# Patient Record
Sex: FEMALE | Race: BLACK OR AFRICAN AMERICAN | NOT HISPANIC OR LATINO | URBAN - METROPOLITAN AREA
[De-identification: names, ages, dates, MRNs, and addresses within clinical notes are randomized per-mention and may not be internally consistent; named-entity substitution may affect disease eponyms.]

---

## 2024-05-03 RX ADMIN — Medication 20 MILLIGRAM(S): at 10:53

## 2024-05-18 ENCOUNTER — INPATIENT (INPATIENT)
Facility: HOSPITAL | Age: 89
LOS: 3 days | Discharge: NURSING, REHAB OR LONG-TERM CARE | End: 2024-05-22
Attending: INTERNAL MEDICINE | Admitting: INTERNAL MEDICINE
Payer: MEDICARE

## 2024-05-18 VITALS
HEART RATE: 116 BPM | SYSTOLIC BLOOD PRESSURE: 211 MMHG | DIASTOLIC BLOOD PRESSURE: 119 MMHG | OXYGEN SATURATION: 99 % | RESPIRATION RATE: 20 BRPM

## 2024-05-18 DIAGNOSIS — I21.4 NON-ST ELEVATION (NSTEMI) MYOCARDIAL INFARCTION: ICD-10-CM

## 2024-05-18 DIAGNOSIS — I48.91 UNSPECIFIED ATRIAL FIBRILLATION: ICD-10-CM

## 2024-05-18 LAB
ALBUMIN SERPL ELPH-MCNC: 3.8 G/DL — SIGNIFICANT CHANGE UP (ref 3.3–5)
ALBUMIN SERPL ELPH-MCNC: 4.2 G/DL — SIGNIFICANT CHANGE UP (ref 3.3–5)
ALP SERPL-CCNC: 61 U/L — SIGNIFICANT CHANGE UP (ref 40–120)
ALP SERPL-CCNC: 66 U/L — SIGNIFICANT CHANGE UP (ref 40–120)
ALT FLD-CCNC: 16 U/L — SIGNIFICANT CHANGE UP (ref 10–45)
ALT FLD-CCNC: 18 U/L — SIGNIFICANT CHANGE UP (ref 10–45)
ANION GAP SERPL CALC-SCNC: 14 MMOL/L — SIGNIFICANT CHANGE UP (ref 5–17)
ANION GAP SERPL CALC-SCNC: 14 MMOL/L — SIGNIFICANT CHANGE UP (ref 5–17)
APPEARANCE UR: CLEAR — SIGNIFICANT CHANGE UP
APTT BLD: 100.2 SEC — HIGH (ref 24.5–35.6)
APTT BLD: 31.1 SEC — SIGNIFICANT CHANGE UP (ref 24.5–35.6)
AST SERPL-CCNC: 48 U/L — HIGH (ref 10–40)
AST SERPL-CCNC: 50 U/L — HIGH (ref 10–40)
BACTERIA # UR AUTO: NEGATIVE /HPF — SIGNIFICANT CHANGE UP
BASE EXCESS BLDV CALC-SCNC: 3.7 MMOL/L — HIGH (ref -2–3)
BASOPHILS # BLD AUTO: 0.06 K/UL — SIGNIFICANT CHANGE UP (ref 0–0.2)
BASOPHILS NFR BLD AUTO: 0.7 % — SIGNIFICANT CHANGE UP (ref 0–2)
BILIRUB SERPL-MCNC: 0.8 MG/DL — SIGNIFICANT CHANGE UP (ref 0.2–1.2)
BILIRUB SERPL-MCNC: 1 MG/DL — SIGNIFICANT CHANGE UP (ref 0.2–1.2)
BILIRUB UR-MCNC: NEGATIVE — SIGNIFICANT CHANGE UP
BUN SERPL-MCNC: 22 MG/DL — SIGNIFICANT CHANGE UP (ref 7–23)
BUN SERPL-MCNC: 23 MG/DL — SIGNIFICANT CHANGE UP (ref 7–23)
CA-I SERPL-SCNC: 1.11 MMOL/L — LOW (ref 1.15–1.33)
CALCIUM SERPL-MCNC: 10.2 MG/DL — SIGNIFICANT CHANGE UP (ref 8.4–10.5)
CALCIUM SERPL-MCNC: 9.2 MG/DL — SIGNIFICANT CHANGE UP (ref 8.4–10.5)
CAST: 4 /LPF — SIGNIFICANT CHANGE UP (ref 0–4)
CHLORIDE SERPL-SCNC: 100 MMOL/L — SIGNIFICANT CHANGE UP (ref 96–108)
CHLORIDE SERPL-SCNC: 104 MMOL/L — SIGNIFICANT CHANGE UP (ref 96–108)
CK MB CFR SERPL CALC: 23.5 NG/ML — HIGH (ref 0–6.7)
CK MB CFR SERPL CALC: 24.5 NG/ML — HIGH (ref 0–6.7)
CK SERPL-CCNC: 1950 U/L — HIGH (ref 25–170)
CK SERPL-CCNC: SIGNIFICANT CHANGE UP (ref 25–170)
CO2 BLDV-SCNC: 31.2 MMOL/L — HIGH (ref 22–26)
CO2 SERPL-SCNC: 24 MMOL/L — SIGNIFICANT CHANGE UP (ref 22–31)
CO2 SERPL-SCNC: 25 MMOL/L — SIGNIFICANT CHANGE UP (ref 22–31)
COLOR SPEC: YELLOW — SIGNIFICANT CHANGE UP
CREAT SERPL-MCNC: 1.11 MG/DL — SIGNIFICANT CHANGE UP (ref 0.5–1.3)
CREAT SERPL-MCNC: 1.17 MG/DL — SIGNIFICANT CHANGE UP (ref 0.5–1.3)
DIFF PNL FLD: ABNORMAL
EGFR: 42 ML/MIN/1.73M2 — LOW
EGFR: 45 ML/MIN/1.73M2 — LOW
EOSINOPHIL # BLD AUTO: 0.06 K/UL — SIGNIFICANT CHANGE UP (ref 0–0.5)
EOSINOPHIL NFR BLD AUTO: 0.7 % — SIGNIFICANT CHANGE UP (ref 0–6)
GAS PNL BLDV: 136 MMOL/L — SIGNIFICANT CHANGE UP (ref 136–145)
GAS PNL BLDV: SIGNIFICANT CHANGE UP
GAS PNL BLDV: SIGNIFICANT CHANGE UP
GLUCOSE SERPL-MCNC: 107 MG/DL — HIGH (ref 70–99)
GLUCOSE SERPL-MCNC: 108 MG/DL — HIGH (ref 70–99)
GLUCOSE UR QL: NEGATIVE MG/DL — SIGNIFICANT CHANGE UP
HCO3 BLDV-SCNC: 30 MMOL/L — HIGH (ref 22–29)
HCT VFR BLD CALC: 40.8 % — SIGNIFICANT CHANGE UP (ref 34.5–45)
HGB BLD-MCNC: 13.5 G/DL — SIGNIFICANT CHANGE UP (ref 11.5–15.5)
HYALINE CASTS # UR AUTO: PRESENT
IMM GRANULOCYTES NFR BLD AUTO: 0.1 % — SIGNIFICANT CHANGE UP (ref 0–0.9)
INR BLD: 0.99 — SIGNIFICANT CHANGE UP (ref 0.85–1.18)
KETONES UR-MCNC: 15 MG/DL
LACTATE SERPL-SCNC: 2 MMOL/L — SIGNIFICANT CHANGE UP (ref 0.5–2)
LACTATE SERPL-SCNC: 2.2 MMOL/L — HIGH (ref 0.5–2)
LEUKOCYTE ESTERASE UR-ACNC: ABNORMAL
LIDOCAIN IGE QN: 29 U/L — SIGNIFICANT CHANGE UP (ref 7–60)
LYMPHOCYTES # BLD AUTO: 3.6 K/UL — HIGH (ref 1–3.3)
LYMPHOCYTES # BLD AUTO: 41.3 % — SIGNIFICANT CHANGE UP (ref 13–44)
MAGNESIUM SERPL-MCNC: 2.2 MG/DL — SIGNIFICANT CHANGE UP (ref 1.6–2.6)
MCHC RBC-ENTMCNC: 30.6 PG — SIGNIFICANT CHANGE UP (ref 27–34)
MCHC RBC-ENTMCNC: 33.1 GM/DL — SIGNIFICANT CHANGE UP (ref 32–36)
MCV RBC AUTO: 92.5 FL — SIGNIFICANT CHANGE UP (ref 80–100)
MONOCYTES # BLD AUTO: 0.51 K/UL — SIGNIFICANT CHANGE UP (ref 0–0.9)
MONOCYTES NFR BLD AUTO: 5.8 % — SIGNIFICANT CHANGE UP (ref 2–14)
NEUTROPHILS # BLD AUTO: 4.48 K/UL — SIGNIFICANT CHANGE UP (ref 1.8–7.4)
NEUTROPHILS NFR BLD AUTO: 51.4 % — SIGNIFICANT CHANGE UP (ref 43–77)
NITRITE UR-MCNC: NEGATIVE — SIGNIFICANT CHANGE UP
NRBC # BLD: 0 /100 WBCS — SIGNIFICANT CHANGE UP (ref 0–0)
NT-PROBNP SERPL-SCNC: HIGH PG/ML (ref 0–300)
PCO2 BLDV: 49 MMHG — HIGH (ref 39–42)
PH BLDV: 7.39 — SIGNIFICANT CHANGE UP (ref 7.32–7.43)
PH UR: 6.5 — SIGNIFICANT CHANGE UP (ref 5–8)
PHOSPHATE SERPL-MCNC: 4.7 MG/DL — HIGH (ref 2.5–4.5)
PLATELET # BLD AUTO: 134 K/UL — LOW (ref 150–400)
PO2 BLDV: <33 MMHG — LOW (ref 25–45)
POTASSIUM BLDV-SCNC: 6.7 MMOL/L — CRITICAL HIGH (ref 3.5–5.1)
POTASSIUM SERPL-MCNC: 3.5 MMOL/L — SIGNIFICANT CHANGE UP (ref 3.5–5.3)
POTASSIUM SERPL-MCNC: 3.7 MMOL/L — SIGNIFICANT CHANGE UP (ref 3.5–5.3)
POTASSIUM SERPL-SCNC: 3.5 MMOL/L — SIGNIFICANT CHANGE UP (ref 3.5–5.3)
POTASSIUM SERPL-SCNC: 3.7 MMOL/L — SIGNIFICANT CHANGE UP (ref 3.5–5.3)
PROT SERPL-MCNC: 7.1 G/DL — SIGNIFICANT CHANGE UP (ref 6–8.3)
PROT SERPL-MCNC: 8.5 G/DL — HIGH (ref 6–8.3)
PROT UR-MCNC: 300 MG/DL
PROTHROM AB SERPL-ACNC: 11.3 SEC — SIGNIFICANT CHANGE UP (ref 9.5–13)
RAPID RVP RESULT: SIGNIFICANT CHANGE UP
RBC # BLD: 4.41 M/UL — SIGNIFICANT CHANGE UP (ref 3.8–5.2)
RBC # FLD: 13.6 % — SIGNIFICANT CHANGE UP (ref 10.3–14.5)
RBC CASTS # UR COMP ASSIST: 3 /HPF — SIGNIFICANT CHANGE UP (ref 0–4)
SAO2 % BLDV: 32.2 % — LOW (ref 67–88)
SARS-COV-2 RNA SPEC QL NAA+PROBE: SIGNIFICANT CHANGE UP
SODIUM SERPL-SCNC: 138 MMOL/L — SIGNIFICANT CHANGE UP (ref 135–145)
SODIUM SERPL-SCNC: 143 MMOL/L — SIGNIFICANT CHANGE UP (ref 135–145)
SP GR SPEC: 1.01 — SIGNIFICANT CHANGE UP (ref 1–1.03)
SQUAMOUS # UR AUTO: 5 /HPF — SIGNIFICANT CHANGE UP (ref 0–5)
TROPONIN T, HIGH SENSITIVITY RESULT: 529 NG/L — CRITICAL HIGH (ref 0–51)
TROPONIN T, HIGH SENSITIVITY RESULT: 665 NG/L — CRITICAL HIGH (ref 0–51)
TROPONIN T, HIGH SENSITIVITY RESULT: 725 NG/L — CRITICAL HIGH (ref 0–51)
UROBILINOGEN FLD QL: 1 MG/DL — SIGNIFICANT CHANGE UP (ref 0.2–1)
WBC # BLD: 8.72 K/UL — SIGNIFICANT CHANGE UP (ref 3.8–10.5)
WBC # FLD AUTO: 8.72 K/UL — SIGNIFICANT CHANGE UP (ref 3.8–10.5)
WBC UR QL: 9 /HPF — HIGH (ref 0–5)

## 2024-05-18 PROCEDURE — 99291 CRITICAL CARE FIRST HOUR: CPT

## 2024-05-18 PROCEDURE — 93010 ELECTROCARDIOGRAM REPORT: CPT

## 2024-05-18 PROCEDURE — 71045 X-RAY EXAM CHEST 1 VIEW: CPT | Mod: 26

## 2024-05-18 PROCEDURE — 93010 ELECTROCARDIOGRAM REPORT: CPT | Mod: 1L,77

## 2024-05-18 PROCEDURE — 99223 1ST HOSP IP/OBS HIGH 75: CPT

## 2024-05-18 RX ORDER — HEPARIN SODIUM 5000 [USP'U]/ML
3500 INJECTION INTRAVENOUS; SUBCUTANEOUS ONCE
Refills: 0 | Status: COMPLETED | OUTPATIENT
Start: 2024-05-18 | End: 2024-05-18

## 2024-05-18 RX ORDER — METOPROLOL TARTRATE 50 MG
12.5 TABLET ORAL EVERY 12 HOURS
Refills: 0 | Status: DISCONTINUED | OUTPATIENT
Start: 2024-05-18 | End: 2024-05-19

## 2024-05-18 RX ORDER — CLOPIDOGREL BISULFATE 75 MG/1
300 TABLET, FILM COATED ORAL ONCE
Refills: 0 | Status: COMPLETED | OUTPATIENT
Start: 2024-05-18 | End: 2024-05-18

## 2024-05-18 RX ORDER — LABETALOL HCL 100 MG
10 TABLET ORAL ONCE
Refills: 0 | Status: COMPLETED | OUTPATIENT
Start: 2024-05-18 | End: 2024-05-18

## 2024-05-18 RX ORDER — SODIUM CHLORIDE 9 MG/ML
500 INJECTION INTRAMUSCULAR; INTRAVENOUS; SUBCUTANEOUS ONCE
Refills: 0 | Status: DISCONTINUED | OUTPATIENT
Start: 2024-05-18 | End: 2024-05-18

## 2024-05-18 RX ORDER — HEPARIN SODIUM 5000 [USP'U]/ML
INJECTION INTRAVENOUS; SUBCUTANEOUS
Qty: 25000 | Refills: 0 | Status: DISCONTINUED | OUTPATIENT
Start: 2024-05-18 | End: 2024-05-19

## 2024-05-18 RX ORDER — SODIUM CHLORIDE 9 MG/ML
500 INJECTION INTRAMUSCULAR; INTRAVENOUS; SUBCUTANEOUS ONCE
Refills: 0 | Status: COMPLETED | OUTPATIENT
Start: 2024-05-18 | End: 2024-05-18

## 2024-05-18 RX ORDER — GABAPENTIN 400 MG/1
100 CAPSULE ORAL ONCE
Refills: 0 | Status: COMPLETED | OUTPATIENT
Start: 2024-05-18 | End: 2024-05-18

## 2024-05-18 RX ORDER — HEPARIN SODIUM 5000 [USP'U]/ML
3500 INJECTION INTRAVENOUS; SUBCUTANEOUS EVERY 6 HOURS
Refills: 0 | Status: DISCONTINUED | OUTPATIENT
Start: 2024-05-18 | End: 2024-05-19

## 2024-05-18 RX ORDER — ISOSORBIDE DINITRATE 5 MG/1
10 TABLET ORAL THREE TIMES A DAY
Refills: 0 | Status: DISCONTINUED | OUTPATIENT
Start: 2024-05-18 | End: 2024-05-18

## 2024-05-18 RX ORDER — ATORVASTATIN CALCIUM 80 MG/1
80 TABLET, FILM COATED ORAL AT BEDTIME
Refills: 0 | Status: DISCONTINUED | OUTPATIENT
Start: 2024-05-18 | End: 2024-05-22

## 2024-05-18 RX ORDER — LANOLIN ALCOHOL/MO/W.PET/CERES
3 CREAM (GRAM) TOPICAL AT BEDTIME
Refills: 0 | Status: DISCONTINUED | OUTPATIENT
Start: 2024-05-18 | End: 2024-05-22

## 2024-05-18 RX ORDER — HYDRALAZINE HCL 50 MG
25 TABLET ORAL DAILY
Refills: 0 | Status: DISCONTINUED | OUTPATIENT
Start: 2024-05-19 | End: 2024-05-20

## 2024-05-18 RX ORDER — HYDRALAZINE HCL 50 MG
10 TABLET ORAL ONCE
Refills: 0 | Status: COMPLETED | OUTPATIENT
Start: 2024-05-18 | End: 2024-05-18

## 2024-05-18 RX ORDER — HYDRALAZINE HCL 50 MG
25 TABLET ORAL ONCE
Refills: 0 | Status: COMPLETED | OUTPATIENT
Start: 2024-05-18 | End: 2024-05-18

## 2024-05-18 RX ORDER — ISOSORBIDE DINITRATE 5 MG/1
10 TABLET ORAL ONCE
Refills: 0 | Status: COMPLETED | OUTPATIENT
Start: 2024-05-18 | End: 2024-05-18

## 2024-05-18 RX ORDER — ASPIRIN/CALCIUM CARB/MAGNESIUM 324 MG
324 TABLET ORAL ONCE
Refills: 0 | Status: COMPLETED | OUTPATIENT
Start: 2024-05-18 | End: 2024-05-18

## 2024-05-18 RX ORDER — LABETALOL HCL 100 MG
20 TABLET ORAL ONCE
Refills: 0 | Status: COMPLETED | OUTPATIENT
Start: 2024-05-18 | End: 2024-05-03

## 2024-05-18 RX ORDER — CLOPIDOGREL BISULFATE 75 MG/1
600 TABLET, FILM COATED ORAL ONCE
Refills: 0 | Status: DISCONTINUED | OUTPATIENT
Start: 2024-05-18 | End: 2024-05-18

## 2024-05-18 RX ADMIN — HEPARIN SODIUM 700 UNIT(S)/HR: 5000 INJECTION INTRAVENOUS; SUBCUTANEOUS at 15:19

## 2024-05-18 RX ADMIN — Medication 10 MILLIGRAM(S): at 12:45

## 2024-05-18 RX ADMIN — Medication 324 MILLIGRAM(S): at 11:30

## 2024-05-18 RX ADMIN — HEPARIN SODIUM 3500 UNIT(S): 5000 INJECTION INTRAVENOUS; SUBCUTANEOUS at 15:19

## 2024-05-18 RX ADMIN — ATORVASTATIN CALCIUM 80 MILLIGRAM(S): 80 TABLET, FILM COATED ORAL at 21:42

## 2024-05-18 RX ADMIN — SODIUM CHLORIDE 500 MILLILITER(S): 9 INJECTION INTRAMUSCULAR; INTRAVENOUS; SUBCUTANEOUS at 10:40

## 2024-05-18 RX ADMIN — Medication 12.5 MILLIGRAM(S): at 22:36

## 2024-05-18 RX ADMIN — ISOSORBIDE DINITRATE 10 MILLIGRAM(S): 5 TABLET ORAL at 13:29

## 2024-05-18 RX ADMIN — Medication 25 MILLIGRAM(S): at 13:57

## 2024-05-18 RX ADMIN — GABAPENTIN 100 MILLIGRAM(S): 400 CAPSULE ORAL at 23:09

## 2024-05-18 RX ADMIN — Medication 10 MILLIGRAM(S): at 10:38

## 2024-05-18 RX ADMIN — ISOSORBIDE DINITRATE 10 MILLIGRAM(S): 5 TABLET ORAL at 20:06

## 2024-05-18 RX ADMIN — Medication 3 MILLIGRAM(S): at 23:09

## 2024-05-18 RX ADMIN — HEPARIN SODIUM 700 UNIT(S)/HR: 5000 INJECTION INTRAVENOUS; SUBCUTANEOUS at 20:23

## 2024-05-18 NOTE — H&P ADULT - PROBLEM SELECTOR PLAN 1
Labs remarkable for Trop 665->725, CK 1950, CKMB 24.5, EKG ISHMAEL V3-V5, patient chest pain free, HDS, on room air  - Heparin gtt for 48 hours  - continous pulse ox, tele monitoring, vitals q4    F: None  E: Replete if K<4 or Mag<2  N: DASH Diet  GIppx: pantoprazole 40 qd  VTEppx: heparin gtt  Dispo: after heparin gtt for 48 hours  PT: ordered Labs remarkable for Trop 665->725, CK 1950, CKMB 24.5 -> will trend Trop to peak  - EKG ISHMAEL V3-V5, patient chest pain free, HDS, on room air  - Heparin gtt for 48 hours  - continous pulse ox, tele monitoring, vitals q4  - f/u TSH, T4, lipid panel, A1c in AM    #Hypertensive Emergency  SBP 200s on admit  - will continue Hydral 25 PO QD and Isordil 10 TID  - goal -180s 5/18/24 and <140s 5/19/24    F: None  E: Replete if K<4 or Mag<2  N: DASH Diet  GIppx: pantoprazole 40 qd  VTEppx: heparin gtt  Dispo: after heparin gtt for 48 hours  PT: ordered Labs remarkable for Trop 665->725, CK 1950, CKMB 24.5 -> will trend Trop to peak  - EKG ISHMAEL V3-V5, patient chest pain free, HDS, on room air  - TTE revealing decreased EF as well as global hypokinesia  - Heparin gtt for 48 hours  - continous pulse ox, tele monitoring, vitals q4  - f/u TSH, T4, lipid panel, A1c in AM    #Hypertensive Emergency  SBP 200s on admit  - will continue Hydral 25 PO QD and Isordil 10 TID  - goal -180s 5/18/24 and <140s 5/19/24    F: None  E: Replete if K<4 or Mag<2  N: DASH Diet  GIppx: pantoprazole 40 qd  VTEppx: heparin gtt  Dispo: after heparin gtt for 48 hours  PT: ordered Labs remarkable for Trop 665->725, CK 1950, CKMB 24.5 -> will trend Trop to peak  - EKG ISHMAEL V3-V5, patient chest pain free, HDS, on room air  - TTE revealing decreased EF as well as global hypokinesia  - Heparin gtt for 48 hours  - continuous pulse ox, tele monitoring, vitals q4  - f/u TSH, T4, lipid panel, A1c in AM  - will start Lopressor 12.5 mg PO BID and Lipitor 80 QHS    #Hypertensive Emergency  SBP 200s on admit  - goal -180s 5/18/24 and <140s 5/19/24    F: None  E: Replete if K<4 or Mag<2  N: DASH Diet  GIppx: pantoprazole 40 qd  VTEppx: heparin gtt  Dispo: after heparin gtt for 48 hours  PT: ordered Labs remarkable for Trop 665->725, CK 1950, CKMB 24.5 -> will trend Trop to peak  - EKG ISHMAEL V3-V5, patient chest pain free, HDS, on room air  - TTE revealing decreased EF as well as global hypokinesia  - will need repeat official TTE  - Heparin gtt for 48 hours  - continuous pulse ox, tele monitoring, vitals q4  - f/u TSH, T4, lipid panel, A1c in AM  - will start Lopressor 12.5 mg PO BID and Lipitor 80 QHS    #Hypertensive Emergency  SBP 200s on admit  - goal -180s 5/18/24 and <140s 5/19/24    F: None  E: Replete if K<4 or Mag<2  N: DASH Diet  GIppx: pantoprazole 40 qd  VTEppx: heparin gtt  Dispo: after heparin gtt for 48 hours  PT: ordered

## 2024-05-18 NOTE — ED PROVIDER NOTE - OBJECTIVE STATEMENT
89 -year-old brought in by EMS, patient has advanced dementia and lives with her son, Scottish speaking, patient has not been feeling well this week with decreased p.o. intake and vomiting x 1 on Wednesday, patient with worsening weakness, no chest pain complaints, and route EMS performed EKG with elevations of ST segment in V3 V4 V5 and STEMI alert called, no fevers, patient contributes minimally to history although actively denying pain at this time.    Patient is on Eliquis and metoprolol at baseline although son reports a history of heart problems he cannot specify

## 2024-05-18 NOTE — ED ADULT TRIAGE NOTE - NS ED TRIAGE CLINICAL UPGRADE PROVIDER FT
Manjeet Low Dose Naltrexone Pregnancy And Lactation Text: Naltrexone is pregnancy category C.  There have been no adequate and well-controlled studies in pregnant women.  It should be used in pregnancy only if the potential benefit justifies the potential risk to the fetus.   Limited data indicates that naltrexone is minimally excreted into breastmilk.

## 2024-05-18 NOTE — ED ADULT NURSE NOTE - OBJECTIVE STATEMENT
Pt. hx of Afib, dementia, HTN, on eliquis and metoprolol, a&ox2 @ baseline, comes to ED bibems as a "STEMI Alert" with r/o ST elevation in V2-V4. Pt. as per son who cares for her, stated she had not been "acting herself" the last few days, had nbnb vomiting on Wednesday and a little this morning, and has been more tired and weaker than usual. Pt. never verbalized the presence of chest pain, SOB, dizziness, and as per son, pt. can usually make pain / needs known. Pt. made UG to MD Burroughs, ccm, pulseox, BP, ekg, and PIVs initiated. Pt. extremely hypertensive 240s/120s on arrival. NAD, sitting comfortably ATT.

## 2024-05-18 NOTE — PATIENT PROFILE ADULT - FALL HARM RISK - HARM RISK INTERVENTIONS
Assistance with ambulation/Assistance OOB with selected safe patient handling equipment/Communicate Risk of Fall with Harm to all staff/Reinforce activity limits and safety measures with patient and family/Tailored Fall Risk Interventions/Use of alarms - bed, chair and/or voice tab/Visual Cue: Yellow wristband and red socks/Bed in lowest position, wheels locked, appropriate side rails in place/Call bell, personal items and telephone in reach/Instruct patient to call for assistance before getting out of bed or chair/Non-slip footwear when patient is out of bed/[place] to call system/Physically safe environment - no spills, clutter or unnecessary equipment/Purposeful Proactive Rounding/Room/bathroom lighting operational, light cord in reach

## 2024-05-18 NOTE — H&P ADULT - PROBLEM SELECTOR PLAN 2
on metoprolol and eliquis at home per son   - AC: heparin gtt  - Rate control: Lopressor 12.5 mg PO BID

## 2024-05-18 NOTE — ED PROVIDER NOTE - CLINICAL SUMMARY MEDICAL DECISION MAKING FREE TEXT BOX
patient with increased lethargy / decreased PO intake and vomiting last week. presenting with HTN and ST elevations on EKG, stemi alert called, Cardiology at bedside and performed bedside echo, pt w decreased EF / global hypokinesia . as pt CP free did not recommend cath lab at this time.   plan to control BP, eval also for metabolic abnormalities, gently hydrate,   once eval trop and response to BP management , will consider further anticoagulation for suspected MI . patient with increased lethargy / decreased PO intake and vomiting last week. presenting with HTN and ST elevations on EKG, stemi alert called, Cardiology at bedside and performed bedside echo, pt w decreased EF / global hypokinesia . as pt CP free did not recommend cath lab at this time.   plan to control BP, eval also for metabolic abnormalities, gently hydrate,   once eval trop and response to BP management , will consider further anticoagulation for suspected MI .    pt seen by Interventional cards and CCU fellow , advised change to hydralazine and isosorbide for HTN management and admission to cards tele

## 2024-05-18 NOTE — H&P ADULT - ASSESSMENT
89yo Vietnamese-Creole speaking F PMHx dementia A&O x 2 (place and name, at baseline) BIBEMS to [facility] ED 5/18/24 for increased lethargy, decreased PO intake and vomitting for the last week and was found to be HTN SBP 200s as well as ISHMAEL on EKG with subsequent STEMI code called in [facility] ED. Patient not a cath candidate as she is asymptomatic, poor functional status and dementia. Patient admitted to [facility] cardiac telemetry for NSTEMI medical management and hypertensive urgency. 89yo Malay-Creole speaking F PMHx dementia A&O x 2 (place and name, at baseline) BIBEMS to [facility] ED 5/18/24 for increased lethargy, decreased PO intake and vomitting for the last week and was found to be HTN SBP 200s as well as ISHMAEL on EKG with subsequent STEMI code called in [facility] ED. Patient not a cath candidate as she is asymptomatic, poor functional status and dementia. Patient admitted to [facility] cardiac telemetry for NSTEMI medical management and hypertensive urgency. Will order CT head before Plavix load. Will start Lopressor 12.5 BID.

## 2024-05-18 NOTE — ED ADULT NURSE NOTE - NSFALLHARMRISKINTERV_ED_ALL_ED
Assistance OOB with selected safe patient handling equipment if applicable/Assistance with ambulation/Communicate risk of Fall with Harm to all staff, patient, and family/Monitor gait and stability/Monitor for mental status changes and reorient to person, place, and time, as needed/Provide visual cue: red socks, yellow wristband, yellow gown, etc/Reinforce activity limits and safety measures with patient and family/Toileting schedule using arm’s reach rule for commode and bathroom/Use of alarms - bed, stretcher, chair and/or video monitoring/Bed in lowest position, wheels locked, appropriate side rails in place/Call bell, personal items and telephone in reach/Instruct patient to call for assistance before getting out of bed/chair/stretcher/Non-slip footwear applied when patient is off stretcher/[place] to call system/Physically safe environment - no spills, clutter or unnecessary equipment/Purposeful Proactive Rounding/Room/bathroom lighting operational, light cord in reach

## 2024-05-18 NOTE — H&P ADULT - HISTORY OF PRESENT ILLNESS
89yoF PMHx _____ BIBEMS to [facility] ED 5/18/24 for increased lethargy, decreased PO intake and vomitting for the last week and was found to be HTN SBP 200s as well as ISHMAEL on EKG with subsequent STEMI alert called. Cardiology Fellow at bedside who performed decreased EF as well as global hypokensia. Patient chest pain free. Patient admitted to [facility] cardiac telemtry for NSTEMI management and hypertensive urgency.      /119  min SpO2 99% room air  Urine: 9 WBC, moderate blood, ketone 15, trace LE, negative nitrite  CXR 5/18/24: no acute infiltrates  Labs remarkable for Trop 665->725, CK 1950, CKMB 24.5, ProBNP [id number]  Interventions in ED: Aspirin 325 mg PO X 1, Heparin gtt, Hydral 10 mg IV X 1, Hydral 25 mg PO X 1, Isordil 10 mg PO X 1, Labetalol 10 IV x 1, Labetolol 20 mg IV x 1, NS 500mL bolus x 2  89yoF PMHx dementia A&O x 2 (place and name, at baseline) BIBEMS to [facility] ED 5/18/24 for increased lethargy, decreased PO intake and vomitting for the last week and was found to be HTN SBP 200s as well as ISHMAEL on EKG with subsequent STEMI alert called in [facility] ED. Cardiology Fellow at bedside who performed bedside TTE revealing decreased EF as well as global hypokinesia. Patient chest pain free. Patient not a cath candidate as she is asymptomatic, poor functional status and dementia. Patient admitted to [facility] cardiac telemetry for NSTEMI medical management and hypertensive urgency.    In speaking with the patients son over the phone, he stated that 3 days before admission, the patient began violently vomitting in her sleep at 2 am and did not wake up until 9 am. He states that since then, she has been vomitting intermittently and he notices her appetite is significantly reduced. Son says she was last seen by her doctors, Dr. Mikal Lacey, 6 years ago. She is on Metoprolol and Eliquis for a heart problem although he does not recall. Patient son stated she has never had a heart procedure or any other surgery, has never been admitted to the hospital before. She lives with her son and uses a walker that her neighbor gave her. Per son, patient has not had a fall or head strike, never had a stroke, no diabetes history, never had a blood clot or embolism.       Vitals in ED: /119  min SpO2 99% room air  Urinalysis: 9 WBC, moderate blood, ketone 15, trace LE, negative nitrite  CXR 5/18/24: no acute infiltrates  Labs remarkable for Trop 665->725, CK 1950, CKMB 24.5, ProBNP [id number]  Interventions in ED: Aspirin 325 mg PO X 1, Heparin gtt, Hydral 10 mg IV X 1, Hydral 25 mg PO X 1, Isordil 10 mg PO X 1, Labetalol 10 IV x 1, Labetolol 20 mg IV x 1, NS 500mL bolus x 2  89yo Hungarian-Creole speaking F PMHx dementia A&O x 2 (place and name, at baseline) BIBEMS to [facility] ED 5/18/24 for increased lethargy, decreased PO intake and vomitting for the last week and was found to be HTN SBP 200s as well as ISHMAEL on EKG with subsequent STEMI alert called in [facility] ED. Cardiology Fellow at bedside who performed bedside TTE revealing decreased EF as well as global hypokinesia. Patient chest pain free. Patient not a cath candidate as she is asymptomatic, poor functional status and dementia. Patient admitted to [facility] cardiac telemetry for NSTEMI medical management and hypertensive urgency.    In speaking with the patients son over the phone, he stated that 3 days before admission, the patient began violently vomitting in her sleep at 2 am and did not wake up until 9 am. He states that since then, she has been vomitting intermittently and he notices her appetite is significantly reduced. Son says she was last seen by her doctors, Dr. Mikal Lacey, 6 years ago. She is on Metoprolol and Eliquis for a heart problem although he does not recall. Patient son stated she has never had a heart procedure or any other surgery, has never been admitted to the hospital before. She lives with her son and uses a walker that her neighbor gave her. Per son, patient has not had a fall or head strike, never had a stroke, no diabetes history, never had a blood clot or embolism.       Vitals in ED: /119  min SpO2 99% room air  Urinalysis: 9 WBC, moderate blood, ketone 15, trace LE, negative nitrite  CXR 5/18/24: no acute infiltrates  Labs remarkable for Trop 665->725, CK 1950, CKMB 24.5, ProBNP 20039  Interventions in ED: Aspirin 325 mg PO X 1, Heparin gtt, Hydral 10 mg IV X 1, Hydral 25 mg PO X 1, Isordil 10 mg PO X 1, Labetalol 10 IV x 1, Labetolol 20 mg IV x 1, NS 500mL bolus x 2  89yo Vietnamese-Creole speaking F PMHx dementia A&O x 2 (place and name, at baseline) BIBEMS to [facility] ED 5/18/24 for increased lethargy, decreased PO intake and vomitting for the last week and was found to be HTN SBP 200s as well as ISHMAEL on EKG with subsequent STEMI code called in [facility] ED. Cardiology Fellow at bedside who performed bedside TTE revealing decreased EF as well as global hypokinesia. Patient chest pain free. Patient not a cath candidate as she is asymptomatic, poor functional status and dementia. Patient admitted to [facility] cardiac telemetry for NSTEMI medical management and hypertensive urgency.    In speaking with the patients son over the phone, he stated that 3 days before admission, the patient began violently vomitting in her sleep at 2 am and did not wake up until 9 am. He states that since then, she has been vomitting intermittently and he notices her appetite is significantly reduced. Son says she was last seen by her doctors, Dr. Mikal Lacey, 6 years ago. She is on Metoprolol and Eliquis for a heart problem although he does not recall. Patient son stated she has never had a heart procedure or any other surgery, has never been admitted to the hospital before. She lives with her son and uses a walker that her neighbor gave her. Per son, patient has not had a fall or head strike, never had a stroke, no diabetes history, never had a blood clot or embolism.       Vitals in ED: /119  min SpO2 99% room air  Urinalysis: 9 WBC, moderate blood, ketone 15, trace LE, negative nitrite  CXR 5/18/24: no acute infiltrates  Labs remarkable for Trop 665->725, CK 1950, CKMB 24.5, ProBNP 44066  Interventions in ED: Aspirin 325 mg PO X 1, Heparin gtt, Hydral 10 mg IV X 1, Hydral 25 mg PO X 1, Isordil 10 mg PO X 1, Labetalol 10 IV x 1, Labetolol 20 mg IV x 1, NS 500mL bolus x 2  89yo Bulgarian-Creole speaking F PMHx dementia A&O x 2 (place and name, at baseline),  Afib (on eliquis and metoprolol at home unclear dose) BIBEMS to [facility] ED 5/18/24 for increased lethargy, decreased PO intake and vomitting for the last week and was found to be HTN SBP 200s as well as ISHMAEL on EKG with subsequent STEMI code called in [facility] ED. Cardiology Fellow at bedside who performed bedside TTE revealing decreased EF as well as global hypokinesia. Patient chest pain free. Patient not a cath candidate as she is asymptomatic, poor functional status and dementia. Patient admitted to [facility] cardiac telemetry for NSTEMI medical management and hypertensive urgency.    In speaking with the patients son over the phone, he stated that 3 days before admission, the patient began violently vomitting in her sleep at 2 am and did not wake up until 9 am. He states that since then, she has been vomitting intermittently and he notices her appetite is significantly reduced. Son says she was last seen by her doctors, Dr. Mikal Lacey, 6 years ago. She is on Metoprolol and Eliquis for a heart problem although he does not recall. Patient son stated she has never had a heart procedure or any other surgery, has never been admitted to the hospital before. She lives with her son and uses a walker that her neighbor gave her. Per son, patient has not had a fall or head strike, never had a stroke, no diabetes history, never had a blood clot or embolism.       Vitals in ED: /119  min SpO2 99% room air  Urinalysis: 9 WBC, moderate blood, ketone 15, trace LE, negative nitrite  CXR 5/18/24: no acute infiltrates  Labs remarkable for Trop 665->725, CK 1950, CKMB 24.5, ProBNP 94506  Interventions in ED: Aspirin 325 mg PO X 1, Heparin gtt, Hydral 10 mg IV X 1, Hydral 25 mg PO X 1, Isordil 10 mg PO X 1, Labetalol 10 IV x 1, Labetolol 20 mg IV x 1, NS 500mL bolus x 2

## 2024-05-18 NOTE — PATIENT PROFILE ADULT - NSTOBACCONEVERSMOKERY/N_GEN_A
Routing refill request to provider for review/approval because:  Labs out of range:  BP    BP Readings from Last 3 Encounters:   11/05/19 139/75   10/10/19 (!) 152/68   09/30/19 (!) 156/89     Last OV 10/10/19.     Last written for 2 month supply on 10/10/19.     Plan to follow up for BP recheck in 1 month (patient was seen in Cardiology on 11/5/19).     No future appointment with Matt Swan PA-C.     Lesley Aaron RN BSN           Yes

## 2024-05-18 NOTE — ED ADULT TRIAGE NOTE - CHIEF COMPLAINT QUOTE
Pt BIBEMS from home for vomiting x 1 day, EMS note for STEMI Alert. History of dementia, not verbalizing CP during assessment. Accompanied by family. MD Burroughs and cardiac fellow at bedside.

## 2024-05-18 NOTE — H&P ADULT - NSHPLABSRESULTS_GEN_ALL_CORE
13.5   8.72  )-----------( 134      ( 18 May 2024 10:16 )             40.8       05-18    143  |  104  |  22  ----------------------------<  107<H>  3.5   |  25  |  1.17    Ca    9.2      18 May 2024 12:45  Phos  4.7     05-18  Mg     2.2     05-18    TPro  7.1  /  Alb  3.8  /  TBili  0.8  /  DBili  x   /  AST  48<H>  /  ALT  18  /  AlkPhos  61  05-18      PT/INR - ( 18 May 2024 10:16 )   PT: 11.3 sec;   INR: 0.99          PTT - ( 18 May 2024 10:16 )  PTT:31.1 sec    CARDIAC MARKERS ( 18 May 2024 10:16 )  x     / x     / 1950 U/L / x     / 24.5 ng/mL        Urinalysis Basic - ( 18 May 2024 12:45 )    Color: x / Appearance: x / SG: x / pH: x  Gluc: 107 mg/dL / Ketone: x  / Bili: x / Urobili: x   Blood: x / Protein: x / Nitrite: x   Leuk Esterase: x / RBC: x / WBC x   Sq Epi: x / Non Sq Epi: x / Bacteria: x        EKG: 13.5   8.72  )-----------( 134      ( 18 May 2024 10:16 )             40.8       05-18    143  |  104  |  22  ----------------------------<  107<H>  3.5   |  25  |  1.17    Ca    9.2      18 May 2024 12:45  Phos  4.7     05-18  Mg     2.2     05-18    TPro  7.1  /  Alb  3.8  /  TBili  0.8  /  DBili  x   /  AST  48<H>  /  ALT  18  /  AlkPhos  61  05-18      PT/INR - ( 18 May 2024 10:16 )   PT: 11.3 sec;   INR: 0.99          PTT - ( 18 May 2024 10:16 )  PTT:31.1 sec    CARDIAC MARKERS ( 18 May 2024 10:16 )  x     / x     / 1950 U/L / x     / 24.5 ng/mL        Urinalysis Basic - ( 18 May 2024 12:45 )    Color: x / Appearance: x / SG: x / pH: x  Gluc: 107 mg/dL / Ketone: x  / Bili: x / Urobili: x   Blood: x / Protein: x / Nitrite: x   Leuk Esterase: x / RBC: x / WBC x   Sq Epi: x / Non Sq Epi: x / Bacteria: x        EKG: ISHMAEL V4-V5

## 2024-05-19 DIAGNOSIS — I21.3 ST ELEVATION (STEMI) MYOCARDIAL INFARCTION OF UNSPECIFIED SITE: ICD-10-CM

## 2024-05-19 LAB
A1C WITH ESTIMATED AVERAGE GLUCOSE RESULT: 5.6 % — SIGNIFICANT CHANGE UP (ref 4–5.6)
ADD ON TEST-SPECIMEN IN LAB: SIGNIFICANT CHANGE UP
ALBUMIN SERPL ELPH-MCNC: 4.4 G/DL — SIGNIFICANT CHANGE UP (ref 3.3–5)
ALP SERPL-CCNC: SIGNIFICANT CHANGE UP (ref 40–120)
ALT FLD-CCNC: SIGNIFICANT CHANGE UP (ref 10–45)
ANION GAP SERPL CALC-SCNC: 18 MMOL/L — HIGH (ref 5–17)
APTT BLD: 33.1 SEC — SIGNIFICANT CHANGE UP (ref 24.5–35.6)
AST SERPL-CCNC: SIGNIFICANT CHANGE UP (ref 10–40)
BASOPHILS # BLD AUTO: 0.08 K/UL — SIGNIFICANT CHANGE UP (ref 0–0.2)
BASOPHILS NFR BLD AUTO: 0.9 % — SIGNIFICANT CHANGE UP (ref 0–2)
BILIRUB SERPL-MCNC: 1.4 MG/DL — HIGH (ref 0.2–1.2)
BLD GP AB SCN SERPL QL: NEGATIVE — SIGNIFICANT CHANGE UP
BUN SERPL-MCNC: 28 MG/DL — HIGH (ref 7–23)
CALCIUM SERPL-MCNC: 10.1 MG/DL — SIGNIFICANT CHANGE UP (ref 8.4–10.5)
CHLORIDE SERPL-SCNC: 98 MMOL/L — SIGNIFICANT CHANGE UP (ref 96–108)
CHOLEST SERPL-MCNC: 296 MG/DL — HIGH
CO2 SERPL-SCNC: 22 MMOL/L — SIGNIFICANT CHANGE UP (ref 22–31)
CREAT SERPL-MCNC: 1.18 MG/DL — SIGNIFICANT CHANGE UP (ref 0.5–1.3)
EGFR: 42 ML/MIN/1.73M2 — LOW
EOSINOPHIL # BLD AUTO: 0.08 K/UL — SIGNIFICANT CHANGE UP (ref 0–0.5)
EOSINOPHIL NFR BLD AUTO: 0.9 % — SIGNIFICANT CHANGE UP (ref 0–6)
ESTIMATED AVERAGE GLUCOSE: 114 MG/DL — SIGNIFICANT CHANGE UP (ref 68–114)
GLUCOSE SERPL-MCNC: 87 MG/DL — SIGNIFICANT CHANGE UP (ref 70–99)
HCT VFR BLD CALC: 45.8 % — HIGH (ref 34.5–45)
HDLC SERPL-MCNC: 106 MG/DL — SIGNIFICANT CHANGE UP
HGB BLD-MCNC: 15.3 G/DL — SIGNIFICANT CHANGE UP (ref 11.5–15.5)
IMM GRANULOCYTES NFR BLD AUTO: 0.3 % — SIGNIFICANT CHANGE UP (ref 0–0.9)
INR BLD: 0.94 — SIGNIFICANT CHANGE UP (ref 0.85–1.18)
LIPID PNL WITH DIRECT LDL SERPL: 169 MG/DL — HIGH
LYMPHOCYTES # BLD AUTO: 2.47 K/UL — SIGNIFICANT CHANGE UP (ref 1–3.3)
LYMPHOCYTES # BLD AUTO: 28.3 % — SIGNIFICANT CHANGE UP (ref 13–44)
MAGNESIUM SERPL-MCNC: 2.3 MG/DL — SIGNIFICANT CHANGE UP (ref 1.6–2.6)
MCHC RBC-ENTMCNC: 30.8 PG — SIGNIFICANT CHANGE UP (ref 27–34)
MCHC RBC-ENTMCNC: 33.4 GM/DL — SIGNIFICANT CHANGE UP (ref 32–36)
MCV RBC AUTO: 92.2 FL — SIGNIFICANT CHANGE UP (ref 80–100)
MONOCYTES # BLD AUTO: 0.44 K/UL — SIGNIFICANT CHANGE UP (ref 0–0.9)
MONOCYTES NFR BLD AUTO: 5 % — SIGNIFICANT CHANGE UP (ref 2–14)
NEUTROPHILS # BLD AUTO: 5.64 K/UL — SIGNIFICANT CHANGE UP (ref 1.8–7.4)
NEUTROPHILS NFR BLD AUTO: 64.6 % — SIGNIFICANT CHANGE UP (ref 43–77)
NON HDL CHOLESTEROL: 190 MG/DL — HIGH
NRBC # BLD: 0 /100 WBCS — SIGNIFICANT CHANGE UP (ref 0–0)
PHOSPHATE SERPL-MCNC: 3.6 MG/DL — SIGNIFICANT CHANGE UP (ref 2.5–4.5)
PLATELET # BLD AUTO: 155 K/UL — SIGNIFICANT CHANGE UP (ref 150–400)
POTASSIUM SERPL-MCNC: SIGNIFICANT CHANGE UP (ref 3.5–5.3)
POTASSIUM SERPL-SCNC: SIGNIFICANT CHANGE UP (ref 3.5–5.3)
PROT SERPL-MCNC: 9.4 G/DL — HIGH (ref 6–8.3)
PROTHROM AB SERPL-ACNC: 10.7 SEC — SIGNIFICANT CHANGE UP (ref 9.5–13)
RBC # BLD: 4.97 M/UL — SIGNIFICANT CHANGE UP (ref 3.8–5.2)
RBC # FLD: 13.5 % — SIGNIFICANT CHANGE UP (ref 10.3–14.5)
RH IG SCN BLD-IMP: NEGATIVE — SIGNIFICANT CHANGE UP
SODIUM SERPL-SCNC: 138 MMOL/L — SIGNIFICANT CHANGE UP (ref 135–145)
TRIGL SERPL-MCNC: 124 MG/DL — SIGNIFICANT CHANGE UP
WBC # BLD: 8.74 K/UL — SIGNIFICANT CHANGE UP (ref 3.8–10.5)
WBC # FLD AUTO: 8.74 K/UL — SIGNIFICANT CHANGE UP (ref 3.8–10.5)

## 2024-05-19 PROCEDURE — 99232 SBSQ HOSP IP/OBS MODERATE 35: CPT

## 2024-05-19 PROCEDURE — 70450 CT HEAD/BRAIN W/O DYE: CPT | Mod: 26

## 2024-05-19 RX ORDER — CLOPIDOGREL BISULFATE 75 MG/1
600 TABLET, FILM COATED ORAL ONCE
Refills: 0 | Status: COMPLETED | OUTPATIENT
Start: 2024-05-19 | End: 2024-05-19

## 2024-05-19 RX ORDER — ACETAMINOPHEN 500 MG
650 TABLET ORAL ONCE
Refills: 0 | Status: COMPLETED | OUTPATIENT
Start: 2024-05-19 | End: 2024-05-19

## 2024-05-19 RX ORDER — METOPROLOL TARTRATE 50 MG
12.5 TABLET ORAL DAILY
Refills: 0 | Status: DISCONTINUED | OUTPATIENT
Start: 2024-05-19 | End: 2024-05-20

## 2024-05-19 RX ORDER — LABETALOL HCL 100 MG
10 TABLET ORAL ONCE
Refills: 0 | Status: COMPLETED | OUTPATIENT
Start: 2024-05-19 | End: 2024-05-19

## 2024-05-19 RX ORDER — OLANZAPINE 15 MG/1
2.5 TABLET, FILM COATED ORAL ONCE
Refills: 0 | Status: COMPLETED | OUTPATIENT
Start: 2024-05-19 | End: 2024-05-19

## 2024-05-19 RX ORDER — ASPIRIN/CALCIUM CARB/MAGNESIUM 324 MG
81 TABLET ORAL DAILY
Refills: 0 | Status: DISCONTINUED | OUTPATIENT
Start: 2024-05-19 | End: 2024-05-22

## 2024-05-19 RX ORDER — METOPROLOL TARTRATE 50 MG
25 TABLET ORAL DAILY
Refills: 0 | Status: DISCONTINUED | OUTPATIENT
Start: 2024-05-19 | End: 2024-05-19

## 2024-05-19 RX ORDER — OLANZAPINE 15 MG/1
2.5 TABLET, FILM COATED ORAL ONCE
Refills: 0 | Status: DISCONTINUED | OUTPATIENT
Start: 2024-05-19 | End: 2024-05-19

## 2024-05-19 RX ORDER — METOPROLOL TARTRATE 50 MG
12.5 TABLET ORAL
Refills: 0 | Status: DISCONTINUED | OUTPATIENT
Start: 2024-05-19 | End: 2024-05-19

## 2024-05-19 RX ORDER — HEPARIN SODIUM 5000 [USP'U]/ML
INJECTION INTRAVENOUS; SUBCUTANEOUS
Qty: 25000 | Refills: 0 | Status: DISCONTINUED | OUTPATIENT
Start: 2024-05-19 | End: 2024-05-19

## 2024-05-19 RX ORDER — LOSARTAN POTASSIUM 100 MG/1
25 TABLET, FILM COATED ORAL DAILY
Refills: 0 | Status: DISCONTINUED | OUTPATIENT
Start: 2024-05-19 | End: 2024-05-20

## 2024-05-19 RX ORDER — HEPARIN SODIUM 5000 [USP'U]/ML
INJECTION INTRAVENOUS; SUBCUTANEOUS
Qty: 25000 | Refills: 0 | Status: DISCONTINUED | OUTPATIENT
Start: 2024-05-19 | End: 2024-05-20

## 2024-05-19 RX ADMIN — Medication 81 MILLIGRAM(S): at 18:09

## 2024-05-19 RX ADMIN — Medication 10 MILLIGRAM(S): at 18:23

## 2024-05-19 RX ADMIN — OLANZAPINE 2.5 MILLIGRAM(S): 15 TABLET, FILM COATED ORAL at 00:45

## 2024-05-19 RX ADMIN — Medication 25 MILLIGRAM(S): at 18:09

## 2024-05-19 NOTE — PROGRESS NOTE ADULT - SUBJECTIVE AND OBJECTIVE BOX
OVERNIGHT EVENTS:  Patient became agitated overnight     SUBJECTIVE / INTERVAL HPI: Patient seen and examined at bedside.    Denies CP, SOB, dizziness/lightheadedness, palpitations    12 point ROS otherwise negative    VITAL SIGNS:  Vital Signs Last 24 Hrs  T(C): 37.2 (19 May 2024 09:15), Max: 37.2 (19 May 2024 09:15)  T(F): 98.9 (19 May 2024 09:15), Max: 98.9 (19 May 2024 09:15)  HR: 98 (19 May 2024 09:00) (70 - 116)  BP: 169/87 (19 May 2024 09:15) (116/69 - 221/133)  BP(mean): 118 (19 May 2024 09:15) (105 - 118)  RR: 19 (19 May 2024 09:15) (16 - 20)  SpO2: 99% (19 May 2024 09:15) (95% - 99%)    Parameters below as of 19 May 2024 09:15  Patient On (Oxygen Delivery Method): room air          I&O's Summary    18 May 2024 07:01  -  19 May 2024 07:00  --------------------------------------------------------  IN: 35 mL / OUT: 100 mL / NET: -65 mL    19 May 2024 07:01  -  19 May 2024 11:52  --------------------------------------------------------  IN: 0 mL / OUT: 0 mL / NET: 0 mL          PHYSICAL EXAM:    General: sitting up in bed, NAD  HEENT: conjunctiva clear; MMM  Neck: supple, no JVD  Cardiovascular: RRR, no murmurs  Respiratory: CTA B/L  Gastrointestinal: soft, NT/ND, +BS  Extremities: WWP, no edema or cyanosis  Vascular: Peripheral pulses palpable 2+ bilaterally/ carotid 2+ b/l, no bruits; radial 2+ b/l; femoral 2+ b/l no bruits; DP/PT 2+ b/l  Neurological: AAOx3, no focal deficits    MEDICATIONS:  MEDICATIONS  (STANDING):  atorvastatin 80 milliGRAM(s) Oral at bedtime  heparin  Infusion.  Unit(s)/Hr (7 mL/Hr) IV Continuous <Continuous>  hydrALAZINE 25 milliGRAM(s) Oral daily  melatonin 3 milliGRAM(s) Oral at bedtime  metoprolol tartrate 12.5 milliGRAM(s) Oral every 12 hours    MEDICATIONS  (PRN):  heparin   Injectable 3500 Unit(s) IV Push every 6 hours PRN For aPTT less than 40      LABS:                        13.5   8.72  )-----------( 134      ( 18 May 2024 10:16 )             40.8       05-18    143  |  104  |  22  ----------------------------<  107<H>  3.5   |  25  |  1.17    Ca    9.2      18 May 2024 12:45  Phos  4.7     05-18  Mg     2.2     05-18    TPro  7.1  /  Alb  3.8  /  TBili  0.8  /  DBili  x   /  AST  48<H>  /  ALT  18  /  AlkPhos  61  05-18      PT/INR - ( 18 May 2024 10:16 )   PT: 11.3 sec;   INR: 0.99          PTT - ( 18 May 2024 20:31 )  PTT:100.2 sec    CARDIAC MARKERS ( 18 May 2024 19:33 )  x     / x     / See Note / x     / 23.5 ng/mL  CARDIAC MARKERS ( 18 May 2024 10:16 )  x     / x     / 1950 U/L / x     / 24.5 ng/mL        TELEMETRY:    RADIOLOGY & ADDITIONAL TESTS: Reviewed. OVERNIGHT EVENTS:  Patient became agitated overnight required zyprexa     SUBJECTIVE / INTERVAL HPI: Patient seen and examined at bedside agitated yelling in creole. Appearing in NAD no grimacing or expressions of pain. Alert and Awake    12 point ROS otherwise negative    VITAL SIGNS:  Vital Signs Last 24 Hrs  T(C): 37.2 (19 May 2024 09:15), Max: 37.2 (19 May 2024 09:15)  T(F): 98.9 (19 May 2024 09:15), Max: 98.9 (19 May 2024 09:15)  HR: 98 (19 May 2024 09:00) (70 - 116)  BP: 169/87 (19 May 2024 09:15) (116/69 - 221/133)  BP(mean): 118 (19 May 2024 09:15) (105 - 118)  RR: 19 (19 May 2024 09:15) (16 - 20)  SpO2: 99% (19 May 2024 09:15) (95% - 99%)    Parameters below as of 19 May 2024 09:15  Patient On (Oxygen Delivery Method): room air          I&O's Summary    18 May 2024 07:01  -  19 May 2024 07:00  --------------------------------------------------------  IN: 35 mL / OUT: 100 mL / NET: -65 mL    19 May 2024 07:01  -  19 May 2024 11:52  --------------------------------------------------------  IN: 0 mL / OUT: 0 mL / NET: 0 mL          PHYSICAL EXAM:    General: sitting up in bed, NAD  HEENT: conjunctiva clear; MMM  Neck: supple, no JVD  Cardiovascular: RRR, no murmurs  Respiratory: CTA B/L  Gastrointestinal: soft, NT/ND, +BS  Extremities: WWP, no edema or cyanosis  Vascular: Peripheral pulses palpable 2+ bilaterally/ carotid 2+ b/l, no bruits; radial 2+ b/l; femoral 2+ b/l no bruits; DP/PT 2+ b/l  Neurological: AAOx1-2 confused and agitated     MEDICATIONS:  MEDICATIONS  (STANDING):  atorvastatin 80 milliGRAM(s) Oral at bedtime  heparin  Infusion.  Unit(s)/Hr (7 mL/Hr) IV Continuous <Continuous>  hydrALAZINE 25 milliGRAM(s) Oral daily  melatonin 3 milliGRAM(s) Oral at bedtime  metoprolol tartrate 12.5 milliGRAM(s) Oral every 12 hours    MEDICATIONS  (PRN):  heparin   Injectable 3500 Unit(s) IV Push every 6 hours PRN For aPTT less than 40      LABS:                        13.5   8.72  )-----------( 134      ( 18 May 2024 10:16 )             40.8       05-18    143  |  104  |  22  ----------------------------<  107<H>  3.5   |  25  |  1.17    Ca    9.2      18 May 2024 12:45  Phos  4.7     05-18  Mg     2.2     05-18    TPro  7.1  /  Alb  3.8  /  TBili  0.8  /  DBili  x   /  AST  48<H>  /  ALT  18  /  AlkPhos  61  05-18      PT/INR - ( 18 May 2024 10:16 )   PT: 11.3 sec;   INR: 0.99          PTT - ( 18 May 2024 20:31 )  PTT:100.2 sec    CARDIAC MARKERS ( 18 May 2024 19:33 )  x     / x     / See Note / x     / 23.5 ng/mL  CARDIAC MARKERS ( 18 May 2024 10:16 )  x     / x     / 1950 U/L / x     / 24.5 ng/mL        TELEMETRY:    RADIOLOGY & ADDITIONAL TESTS: Reviewed.

## 2024-05-19 NOTE — PROGRESS NOTE ADULT - ASSESSMENT
89yo Khmer-Creole speaking F PMHx dementia A&O x 2 (place and name, at baseline) BIBEMS to [facility] ED 5/18/24 for increased lethargy, decreased PO intake and vomitting for the last week and was found to be HTN SBP 200s as well as ISHMAEL on EKG with subsequent STEMI code called in [facility] ED. Patient not a cath candidate as she is asymptomatic, poor functional status and dementia. Patient admitted to [facility] cardiac telemetry for NSTEMI medical management and hypertensive urgency. Will order CT head before Plavix load. Will start Lopressor 12.5 BID.  89yo Macedonian-Creole speaking F PMHx dementia A&O x 2 (place and name, at baseline) BIBEMS to [facility] ED 5/18/24 for increased lethargy, decreased PO intake and vomitting for the last week and was found to be HTN SBP 200s as well as ISHMAEL on EKG with subsequent STEMI code called in [facility] ED. Patient not a cath candidate as she is asymptomatic, poor functional status and dementia therefore patient admitted to [facility] for medical management course complicated by Sundowning and refusal of care. Pallative consulted and plan for medical management and GOC with son

## 2024-05-19 NOTE — PROGRESS NOTE ADULT - PROBLEM SELECTOR PLAN 1
Labs remarkable for Trop 665->725-->529, CK 1950, CKMB 24.5 -> will trend Trop to peak  - EKG ISHMAEL V3-V5, patient chest pain free, HDS, on room air  - TTE revealing decreased EF as well as global hypokinesia per CCU Fellow bedside echo. full report ordered   - Official TTE ordered   - Heparin gtt for 48 hours started around 2pm on 5/18.   - continuous pulse ox, tele monitoring, vitals q4  - f/u TSH, T4, lipid panel, A1c in AM patient refused Labs remarkable for Trop 665->725-->529, CK 1950, CKMB 24.5 -> will trend Trop to peak\  s/p ASA Load in ER, not PLAVIX Loaded at this time pending head CT given some ?AMS and Hypertensive to the 200s on admission with ? fall and pending CT head before plavix load.   - EKG ISHMAEL V3-V5, patient chest pain free, HDS, on room air  - TTE revealing decreased EF as well as global hypokinesia per CCU Fellow bedside echo. full report ordered   - Official TTE ordered   - Heparin gtt for 48 hours started around 2pm on 5/18, plan to stop tomr 5/19    - continuous pulse ox, tele monitoring, vitals q4  - f/u TSH, T4, lipid panel, A1c in AM patient refused Labs remarkable for Trop 665->725-->529, CK 1950, CKMB 24.5 -> will trend Trop to peak\  s/p ASA Load in ER, not PLAVIX Loaded at this time pending head CT given some ?AMS and Hypertensive to the 200s on admission with ? fall and pending CT head before plavix load.   - EKG ISHMAEL V3-V5, patient chest pain free, HDS, on room air  - TTE revealing decreased EF as well as global hypokinesia per CCU Fellow bedside echo. full report ordered   - Official TTE ordered   - Heparin gtt for 48 hours started around 2pm on 5/18, plan to stop tomr 5/19 see chart note event as heparin was stopped for period of time   - f/u TSH, T4, lipid panel, A1c in AM patient refused

## 2024-05-19 NOTE — PROGRESS NOTE ADULT - PROBLEM SELECTOR PLAN 2
Per son is on home Metoprolol and Eliquis at home per son   - AC: heparin gtt    - Rate control: Lopressor 12.5 mg PO BID

## 2024-05-19 NOTE — CHART NOTE - NSCHARTNOTEFT_GEN_A_CORE
Patient seen and evaluated this morning agitated and refusing exam yelling. Per night team patient became agitated, overnight, sundowning and aggressive requiring zyprexa. This morning patient refusing vitals and lab work. Last lab work from last night showing troponin downtrending. This morning patient was agitated able to get on set of VS with /66, Heart Rates 80s. RR 18, 95%. Repeat BP obtained 168/95. Pallative consult called for discussion of GOC. This provider vikram Payne spoke to at length given concern of patients clinical status and GOC given active medical treatment for STEMI and patients refusal for medication and Blood work at this time. Patients son states wishes are for Full Code at this time he says he will visit tomorrow to help facilitate in car. Patient reexamined with son on phone and was able to have her relax appearing still to be AxO 1 to self and son but not place. Will attempt repeat vitals and medication including Aspirin and Plavix and continued heparin drip with line in place. Patient to be reexamined in an hour with nurse at bedside and will call son to see if able to calm her down while we administer medication and vital signs Patient seen and evaluated this morning agitated and refusing exam yelling. Per night team patient became agitated, overnight, sundowning and aggressive requiring zyprexa. This morning patient refusing vitals and lab work. Last lab work from last night showing troponin downtrending. This morning patient was agitated able to get on set of VS with /66, Heart Rates 80s. RR 18, 95%. Repeat BP obtained 168/95. Palliative consulted for discussion of GOC. Pati Omkar Payne was contacted and spoke to at length given concern of patients clinical status and GOC given active medical treatment for STEMI and patients refusal for medication and Blood work at this time. Patients son states wishes is for patient to be Full Code at this time. He says he will visit tomorrow to help facilitate in care. Patient re-examined with son on phone and was able to have her relax a little. Appearing still to be AxO 1 to self and son but not place. Will attempt repeat vitals and medication including Aspirin and continue heparin drip with line in place. Patient to be reexamined in an hour with nurse at bedside and will call son to see if able to calm her down while we administer medication and vital signs.

## 2024-05-19 NOTE — CHART NOTE - NSCHARTNOTEFT_GEN_A_CORE
Patient calm and cooperative ICU nurse and Night providers along side this provider all at bedside. Patient able to sit still and allow for lab draws. ICU RN able to place US guided IV for access as both had come out. Patient calm and cooperative during this time. Sent off Stat Coags. BMP. CBC and T+S x2. Transport at bedside for CT scan.

## 2024-05-20 DIAGNOSIS — F03.90 UNSPECIFIED DEMENTIA WITHOUT BEHAVIORAL DISTURBANCE: ICD-10-CM

## 2024-05-20 LAB
ALBUMIN SERPL ELPH-MCNC: 3.5 G/DL — SIGNIFICANT CHANGE UP (ref 3.3–5)
ALP SERPL-CCNC: 59 U/L — SIGNIFICANT CHANGE UP (ref 40–120)
ALT FLD-CCNC: 22 U/L — SIGNIFICANT CHANGE UP (ref 10–45)
ANION GAP SERPL CALC-SCNC: 15 MMOL/L — SIGNIFICANT CHANGE UP (ref 5–17)
APTT BLD: 39.9 SEC — HIGH (ref 24.5–35.6)
AST SERPL-CCNC: 36 U/L — SIGNIFICANT CHANGE UP (ref 10–40)
BILIRUB SERPL-MCNC: 1.1 MG/DL — SIGNIFICANT CHANGE UP (ref 0.2–1.2)
BUN SERPL-MCNC: 28 MG/DL — HIGH (ref 7–23)
CALCIUM SERPL-MCNC: 9.1 MG/DL — SIGNIFICANT CHANGE UP (ref 8.4–10.5)
CHLORIDE SERPL-SCNC: 103 MMOL/L — SIGNIFICANT CHANGE UP (ref 96–108)
CO2 SERPL-SCNC: 25 MMOL/L — SIGNIFICANT CHANGE UP (ref 22–31)
CREAT SERPL-MCNC: 1.34 MG/DL — HIGH (ref 0.5–1.3)
CULTURE RESULTS: SIGNIFICANT CHANGE UP
EGFR: 36 ML/MIN/1.73M2 — LOW
GLUCOSE SERPL-MCNC: 88 MG/DL — SIGNIFICANT CHANGE UP (ref 70–99)
HCT VFR BLD CALC: 36.4 % — SIGNIFICANT CHANGE UP (ref 34.5–45)
HGB BLD-MCNC: 12.1 G/DL — SIGNIFICANT CHANGE UP (ref 11.5–15.5)
LACTATE SERPL-SCNC: 1.7 MMOL/L — SIGNIFICANT CHANGE UP (ref 0.5–2)
MAGNESIUM SERPL-MCNC: 2.1 MG/DL — SIGNIFICANT CHANGE UP (ref 1.6–2.6)
MCHC RBC-ENTMCNC: 31 PG — SIGNIFICANT CHANGE UP (ref 27–34)
MCHC RBC-ENTMCNC: 33.2 GM/DL — SIGNIFICANT CHANGE UP (ref 32–36)
MCV RBC AUTO: 93.3 FL — SIGNIFICANT CHANGE UP (ref 80–100)
NRBC # BLD: 0 /100 WBCS — SIGNIFICANT CHANGE UP (ref 0–0)
PLATELET # BLD AUTO: 116 K/UL — LOW (ref 150–400)
POTASSIUM SERPL-MCNC: 3.1 MMOL/L — LOW (ref 3.5–5.3)
POTASSIUM SERPL-SCNC: 3.1 MMOL/L — LOW (ref 3.5–5.3)
PROT SERPL-MCNC: 7 G/DL — SIGNIFICANT CHANGE UP (ref 6–8.3)
RBC # BLD: 3.9 M/UL — SIGNIFICANT CHANGE UP (ref 3.8–5.2)
RBC # FLD: 13.5 % — SIGNIFICANT CHANGE UP (ref 10.3–14.5)
SODIUM SERPL-SCNC: 143 MMOL/L — SIGNIFICANT CHANGE UP (ref 135–145)
SPECIMEN SOURCE: SIGNIFICANT CHANGE UP
T4 FREE SERPL-MCNC: 1.52 NG/DL — SIGNIFICANT CHANGE UP (ref 0.93–1.7)
TSH SERPL-MCNC: 2.26 UIU/ML — SIGNIFICANT CHANGE UP (ref 0.27–4.2)
WBC # BLD: 7.13 K/UL — SIGNIFICANT CHANGE UP (ref 3.8–10.5)
WBC # FLD AUTO: 7.13 K/UL — SIGNIFICANT CHANGE UP (ref 3.8–10.5)

## 2024-05-20 PROCEDURE — 99223 1ST HOSP IP/OBS HIGH 75: CPT | Mod: 25

## 2024-05-20 PROCEDURE — 93306 TTE W/DOPPLER COMPLETE: CPT | Mod: 26

## 2024-05-20 PROCEDURE — 99233 SBSQ HOSP IP/OBS HIGH 50: CPT

## 2024-05-20 PROCEDURE — 99497 ADVNCD CARE PLAN 30 MIN: CPT | Mod: 25

## 2024-05-20 RX ORDER — POTASSIUM CHLORIDE 20 MEQ
40 PACKET (EA) ORAL ONCE
Refills: 0 | Status: COMPLETED | OUTPATIENT
Start: 2024-05-20 | End: 2024-05-20

## 2024-05-20 RX ORDER — OLANZAPINE 15 MG/1
2.5 TABLET, FILM COATED ORAL EVERY 12 HOURS
Refills: 0 | Status: DISCONTINUED | OUTPATIENT
Start: 2024-05-20 | End: 2024-05-22

## 2024-05-20 RX ORDER — NIFEDIPINE 30 MG
30 TABLET, EXTENDED RELEASE 24 HR ORAL EVERY 12 HOURS
Refills: 0 | Status: DISCONTINUED | OUTPATIENT
Start: 2024-05-20 | End: 2024-05-21

## 2024-05-20 RX ORDER — METOPROLOL TARTRATE 50 MG
2.5 TABLET ORAL ONCE
Refills: 0 | Status: COMPLETED | OUTPATIENT
Start: 2024-05-20 | End: 2024-05-20

## 2024-05-20 RX ORDER — APIXABAN 2.5 MG/1
2.5 TABLET, FILM COATED ORAL EVERY 12 HOURS
Refills: 0 | Status: DISCONTINUED | OUTPATIENT
Start: 2024-05-20 | End: 2024-05-22

## 2024-05-20 RX ORDER — POTASSIUM CHLORIDE 20 MEQ
10 PACKET (EA) ORAL ONCE
Refills: 0 | Status: COMPLETED | OUTPATIENT
Start: 2024-05-20 | End: 2024-05-21

## 2024-05-20 RX ORDER — HYDRALAZINE HCL 50 MG
5 TABLET ORAL ONCE
Refills: 0 | Status: COMPLETED | OUTPATIENT
Start: 2024-05-20 | End: 2024-05-20

## 2024-05-20 RX ORDER — METOPROLOL TARTRATE 50 MG
5 TABLET ORAL EVERY 6 HOURS
Refills: 0 | Status: DISCONTINUED | OUTPATIENT
Start: 2024-05-20 | End: 2024-05-20

## 2024-05-20 RX ORDER — METOPROLOL TARTRATE 50 MG
25 TABLET ORAL DAILY
Refills: 0 | Status: DISCONTINUED | OUTPATIENT
Start: 2024-05-20 | End: 2024-05-21

## 2024-05-20 RX ORDER — METOPROLOL TARTRATE 50 MG
2.5 TABLET ORAL EVERY 6 HOURS
Refills: 0 | Status: DISCONTINUED | OUTPATIENT
Start: 2024-05-20 | End: 2024-05-20

## 2024-05-20 RX ORDER — HYDRALAZINE HCL 50 MG
5 TABLET ORAL EVERY 8 HOURS
Refills: 0 | Status: DISCONTINUED | OUTPATIENT
Start: 2024-05-20 | End: 2024-05-20

## 2024-05-20 RX ORDER — CLOPIDOGREL BISULFATE 75 MG/1
75 TABLET, FILM COATED ORAL DAILY
Refills: 0 | Status: DISCONTINUED | OUTPATIENT
Start: 2024-05-21 | End: 2024-05-21

## 2024-05-20 RX ADMIN — HEPARIN SODIUM 650 UNIT(S)/HR: 5000 INJECTION INTRAVENOUS; SUBCUTANEOUS at 08:20

## 2024-05-20 RX ADMIN — HEPARIN SODIUM 500 UNIT(S)/HR: 5000 INJECTION INTRAVENOUS; SUBCUTANEOUS at 00:19

## 2024-05-20 RX ADMIN — CLOPIDOGREL BISULFATE 600 MILLIGRAM(S): 75 TABLET, FILM COATED ORAL at 00:48

## 2024-05-20 RX ADMIN — HEPARIN SODIUM 650 UNIT(S)/HR: 5000 INJECTION INTRAVENOUS; SUBCUTANEOUS at 07:18

## 2024-05-20 RX ADMIN — Medication 5 MILLIGRAM(S): at 18:19

## 2024-05-20 RX ADMIN — Medication 81 MILLIGRAM(S): at 08:23

## 2024-05-20 RX ADMIN — Medication 2.5 MILLIGRAM(S): at 02:16

## 2024-05-20 RX ADMIN — Medication 2.5 MILLIGRAM(S): at 06:48

## 2024-05-20 RX ADMIN — Medication 5 MILLIGRAM(S): at 15:01

## 2024-05-20 RX ADMIN — Medication 2.5 MILLIGRAM(S): at 01:17

## 2024-05-20 RX ADMIN — Medication 40 MILLIEQUIVALENT(S): at 08:23

## 2024-05-20 RX ADMIN — Medication 2.5 MILLIGRAM(S): at 11:54

## 2024-05-20 RX ADMIN — LOSARTAN POTASSIUM 25 MILLIGRAM(S): 100 TABLET, FILM COATED ORAL at 08:23

## 2024-05-20 NOTE — PROGRESS NOTE ADULT - ASSESSMENT
89yo Lithuanian-Creole speaking F PMHx dementia A&O x 2 (place and name, at baseline) BIBEMS to [facility] ED 5/18/24 for increased lethargy, decreased PO intake and vomitting for the last week and was found to be HTN SBP 200s as well as ISHMAEL on EKG with subsequent STEMI code called in [facility] ED. Patient not a cath candidate as she is asymptomatic, poor functional status and dementia therefore patient admitted to [facility] for medical management course complicated by Sundowning and refusal of care. Pallative consulted and plan for medical management and GOC with son  89yo Turkmen-Creole speaking F PMHx dementia A&O x 2 (place and name, at baseline) BIBEMS to [facility] ED 5/18/24 for increased lethargy, decreased PO intake and vomitting for the last week and was found to be HTN SBP 200s as well as ISHMAEL on EKG with subsequent STEMI code called in [facility] ED. Patient not a cath candidate as she is asymptomatic, poor functional status and dementia therefore patient admitted to [facility] for medical management course complicated by Sundowning and refusal of care. Pallative consulted and plan for medical management and GOC with son and now deemed DNR/DNI. PT rec [facility].  89yo Malay-Creole speaking F DNR/DNI PMHx dementia A&O x 2 (place and name, at baseline), Afib BIBEMS to [facility] ED 5/18/24 for increased lethargy, decreased PO intake and vomitting for the last week and was found to be HTN SBP 200s as well as ISHMAEL on EKG with subsequent STEMI code called in [facility] ED. Patient not a cath candidate as she is asymptomatic, poor functional status and dementia therefore patient admitted to [facility] for medical management course complicated by Sundowning and refusal of care. Pallative consulted and plan for medical management and GOC with son and now deemed DNR/DNI. PT rec ORIN.

## 2024-05-20 NOTE — PROGRESS NOTE ADULT - PROBLEM SELECTOR PLAN 2
Per son is on home Metoprolol and Eliquis at home per son   - AC: heparin gtt    - Rate control: Lopressor 12.5 mg PO BID Per son is on home Metoprolol and Eliquis at home per son  - AC: Eliquis 2.5 BID  - Rate control: Toprol 25 QD

## 2024-05-20 NOTE — CONSULT NOTE ADULT - PROBLEM SELECTOR RECOMMENDATION 2
.  Not a candidate for PCI  -s/p heparin gtt and DAPT  -cautioned son that patient could suffer recurrent MI or cardiac event  -EF is preserved so not a hospice qualifying diagnosis

## 2024-05-20 NOTE — CONSULT NOTE ADULT - CONVERSATION DETAILS
Reviewed patient's hospital course and interval developments. Discussed advanced directives including code status, HCP completion, and hospice eligibility. Cautioned son that patient could suffer recurrent MI or cardiac event. Advised that protecting the patient from overly aggressive interventions was prudent since she is not a candidate for definitive management of her STEMI. After discussion, son in agreement with MOLST completion with DNR/DNI. Currently, the patient does not definitively meet hospice criteria given preserved EF, hemodynamic stability, and lack of symptoms. Son is concerned he can no longer manage the patient at home and is looking for [facility] as a bridge to LTC (another barrier to home hospice).

## 2024-05-20 NOTE — CONSULT NOTE ADULT - PROBLEM SELECTOR RECOMMENDATION 9
.  PPSV = 50%, requires assistance for many ADLs  -PT Eval recommending [facility]  -c/w supportive care, OOB, encourage movement

## 2024-05-20 NOTE — DIETITIAN INITIAL EVALUATION ADULT - ADD RECOMMEND
1. Consider need for SLP EVAL - Should pt be noted with s/s of issues swallowing or if issues suspected, recommend NPO/SLP.   >> With passing results, recommend regular diet/ ensure MAX x1-2/day 150kcal/30gm prot per 1 can, with PO consistency per SLP Recs. Please Encourage PO during meals as able. Monitor %PO intake.  2. Monitor Skin, Wt, Pain, Labs, GI, GOC.  3. RD to remain available for additional nutrition interventions as needed.   ** High Nutrition Risk.

## 2024-05-20 NOTE — DIETITIAN INITIAL EVALUATION ADULT - PHYSCIAL ASSESSMENT
No EMR Ht, son reports ht of 5'0'' (ETU167 pounds +/-10%)  Admit wt 83 pounds, Bedscale wt obtained today at 84 pounds  Based on Admit wt: %IBW83%, BMI16.1   x 3-4 years

## 2024-05-20 NOTE — DIETITIAN INITIAL EVALUATION ADULT - NUTRITIONGOAL OUTCOME1
Pt will meet at least 75% of nutrient needs / Show no further s/s of malnutrition / Monitor for GOC- Honor at all times

## 2024-05-20 NOTE — CONSULT NOTE ADULT - SUBJECTIVE AND OBJECTIVE BOX
[facility] Geriatrics and Palliative Care  Sagar Andrews, Palliative Care Attending  Contact Info: Call [phone] (HEAL Line) or message on Microsoft Teams (Sagar Andrews)    HPI:  89yo Mozambican-Creole speaking F PMHx dementia A&O x 2 (place and name, at baseline),  Afib (on eliquis and metoprolol at home unclear dose) BIBEMS to [facility] ED 5/18/24 for increased lethargy, decreased PO intake and vomitting for the last week and was found to be HTN SBP 200s as well as ISHMAEL on EKG with subsequent STEMI code called in [facility] ED. Cardiology Fellow at bedside who performed bedside TTE revealing decreased EF as well as global hypokinesia. Patient chest pain free. Patient not a cath candidate as she is asymptomatic, poor functional status and dementia. Patient admitted to [facility] cardiac telemetry for NSTEMI medical management and hypertensive urgency.    In speaking with the patients son over the phone, he stated that 3 days before admission, the patient began violently vomitting in her sleep at 2 am and did not wake up until 9 am. He states that since then, she has been vomitting intermittently and he notices her appetite is significantly reduced. Son says she was last seen by her doctors, Dr. Mikal Lacey, 6 years ago. She is on Metoprolol and Eliquis for a heart problem although he does not recall. Patient son stated she has never had a heart procedure or any other surgery, has never been admitted to the hospital before. She lives with her son and uses a walker that her neighbor gave her. Per son, patient has not had a fall or head strike, never had a stroke, no diabetes history, never had a blood clot or embolism.       Vitals in ED: /119  min SpO2 99% room air  Urinalysis: 9 WBC, moderate blood, ketone 15, trace LE, negative nitrite  CXR 5/18/24: no acute infiltrates  Labs remarkable for Trop 665->725, CK 1950, CKMB 24.5, ProBNP 36635  Interventions in ED: Aspirin 325 mg PO X 1, Heparin gtt, Hydral 10 mg IV X 1, Hydral 25 mg PO X 1, Isordil 10 mg PO X 1, Labetalol 10 IV x 1, Labetolol 20 mg IV x 1, NS 500mL bolus x 2  (18 May 2024 14:45)    Patient seen and examined at bedside. Appears overtly comfortable. Denies any significant complaint. Comprehensive symptom assessment and GOC exploration as noted below. Further collateral obtained from primary team, chart, and family.    PERTINENT PM/SXH:   Dementia        FAMILY HISTORY:    No history of  in first degree relatives  Unable to obtain due to encephalopathy    ITEMS NOT CHECKED ARE NOT PRESENT  SOCIAL HISTORY:   Significant other/partner:  []  Children:  []  Substance hx:  []   Tobacco hx:  []   Alcohol hx: []   Home Opioid hx:  [] I-Stop Reference No:  - no active Rx's / see chart note  Living Situation: []Home  []Long term care  []Rehab []Other  Advent/Spiritual practice: ; Role of organized Gnosticist [] important [] some [] unable to assess  Coping: [] well [] with difficulty [] poor coping [] unable to assess  Support system: [] strong [] adequate [] inadequate    ADVANCE DIRECTIVES:    []MOLST  []Living Will  DECISION MAKER(s):  [] Health Care Proxy(s)  [] Surrogate(s)  [] Guardian           Name(s)/Phone Number(s):     BASELINE (I)ADLs (prior to admission):  [place]: []Total  [] Moderate []Dependent    ALLERGIES:  No Known Allergies    MEDICATIONS  (STANDING):  aspirin enteric coated 81 milliGRAM(s) Oral daily  atorvastatin 80 milliGRAM(s) Oral at bedtime  melatonin 3 milliGRAM(s) Oral at bedtime  metoprolol succinate ER 25 milliGRAM(s) Oral daily  NIFEdipine XL 30 milliGRAM(s) Oral every 12 hours    MEDICATIONS  (PRN):  OLANZapine Injectable 2.5 milliGRAM(s) IntraMuscular every 12 hours PRN agitation    Analgesic Use (Scheduled and PRNs) for past 24 hours:      PRESENT SYMPTOMS: []Unable to obtain due to poor mentation/encephalopathy  Source if other than patient:  []Family   []Team     Pain: [] yes [] no  QOL impact -   Location -                    Aggravating Factors -  Quality -  Radiation -  Timing -  Severity (0-10 scale) -   Minimal Acceptable Level (0-10 scale) -    CPOT Score:  (Critical Care Pain Assessment)    PAIN AD Score:  (Nonverbal Pain Assessment)    Dyspnea:                           []Mild  []Moderate []Severe  Anxiety:                             []Mild []Moderate []Severe  Fatigue:                             []Mild []Moderate []Severe  Nausea:                             []Mild []Moderate []Severe  Loss of Appetite:              []Mild []Moderate []Severe  Constipation:                    []Mild []Moderate []Severe    Other Symptoms:  [x]All other review of systems negative     Palliative Performance Status Version 2:  %  (Functional Assessment Tool)    GENERAL:  [] NAD []Alert []Lethargic  []Cachexia  []Unarousable  []Verbal  []Non-Verbal  BEHAVIORAL:   []Anxiety  []Delirium []Agitation []Cooperative []Oriented x  HEENT:  []Normal  [] Moist Mucous Membranes []Dry mouth   []ET Tube/Trach  []Oral lesions  PULMONARY:   []Clear []Tachypnea  []Audible excessive secretions  []Normal Work of Breathing []Labored Breathing  []Rhonchi []Crackles []Wheezing  CARDIOVASCULAR:    []Regular Rate []Regular Rhythm []Irregular []Tachy  []Raul  GASTROINTESTINAL:  []Soft  []Distended   []+BS  []Non tender []Tender  []PEG []OGT/ NGT  Last BM:  GENITOURINARY:  []Normal [] Incontinent   []Oliguria/Anuria   []Waterman  MUSCULOSKELETAL:   []Normal Extremities  []Weakness  []Bed/Wheelchair bound []Edema  NEUROLOGIC:   []No focal deficits  []Cognitive impairment  []Dysphagia []Dysarthria []Paresis []Encephalopathic  SKIN:   []Normal   []Pressure ulcer(s)  []Rash    CRITICAL CARE:  [ ]Shock Present  [ ]Septic [ ]Cardiogenic [ ]Neurologic [ ]Hypovolemic  [ ] Vasopressors [ ]Inotropes   [ ]Respiratory failure present [ ]Mechanical Ventilation [ ]Non-invasive ventilatory support [ ]High-Flow  [ ]Acute  [ ]Chronic [ ]Hypoxic  [ ]Hypercarbic   [ ]Other organ failure    Vital Signs Last 24 Hrs  T(C): 36.4 (20 May 2024 08:15), Max: 37 (19 May 2024 21:19)  T(F): 97.6 (20 May 2024 08:15), Max: 98.6 (19 May 2024 21:19)  HR: 68 (20 May 2024 11:50) (68 - 110)  BP: 159/83 (20 May 2024 11:50) (124/85 - 233/151)  BP(mean): 109 (20 May 2024 06:10) (91 - 178)  RR: 18 (20 May 2024 11:50) (15 - 18)  SpO2: 99% (20 May 2024 11:50) (95% - 99%)    Parameters below as of 20 May 2024 11:50  Patient On (Oxygen Delivery Method): room air         LABS: Personally reviewed and interpreted                        12.1   7.13  )-----------( 116      ( 20 May 2024 05:30 )             36.4   05-20    143  |  103  |  28<H>  ----------------------------<  88  3.1<L>   |  25  |  1.34<H>    Ca    9.1      20 May 2024 05:30  Phos  3.6     05-19  Mg     2.1     05-20    TPro  7.0  /  Alb  3.5  /  TBili  1.1  /  DBili  x   /  AST  36  /  ALT  22  /  AlkPhos  59  05-20    RADIOLOGY & ADDITIONAL STUDIES: Personally reviewed and interpreted    REFERRALS:  [x]Social Work/Case management []PT/OT []Chaplaincy  []Hospice  []Patient/Family Support []Massage Therapy []Music Therapy []Holistic RN []Ethics    DISCUSSION OF CASE: Family - to provide updates and emotional support; Primary Team/RN - to discuss plan of care [facility] Geriatrics and Palliative Care  Sagar Andrews, Palliative Care Attending  Contact Info: Call [phone] (HEAL Line) or message on Microsoft Teams (Sagar Andrews)    HPI:  89yo Syrian-Creole speaking F PMHx dementia A&O x 2 (place and name, at baseline),  Afib (on eliquis and metoprolol at home unclear dose) BIBEMS to [facility] ED 5/18/24 for increased lethargy, decreased PO intake and vomitting for the last week and was found to be HTN SBP 200s as well as ISHMAEL on EKG with subsequent STEMI code called in [facility] ED. Cardiology Fellow at bedside who performed bedside TTE revealing decreased EF as well as global hypokinesia. Patient chest pain free. Patient not a cath candidate as she is asymptomatic, poor functional status and dementia. Patient admitted to [facility] cardiac telemetry for NSTEMI medical management and hypertensive urgency.    In speaking with the patients son over the phone, he stated that 3 days before admission, the patient began violently vomitting in her sleep at 2 am and did not wake up until 9 am. He states that since then, she has been vomitting intermittently and he notices her appetite is significantly reduced. Son says she was last seen by her doctors, Dr. Mikal Lacey, 6 years ago. She is on Metoprolol and Eliquis for a heart problem although he does not recall. Patient son stated she has never had a heart procedure or any other surgery, has never been admitted to the hospital before. She lives with her son and uses a walker that her neighbor gave her. Per son, patient has not had a fall or head strike, never had a stroke, no diabetes history, never had a blood clot or embolism.     Patient seen and examined at bedside. Appears overtly comfortable. Denies any significant complaint. Awake and eating lunch. Recognizes her son and neighbor. Comprehensive symptom assessment and GOC exploration as noted below. Further collateral obtained from primary team, chart, and family.    PERTINENT PM/SXH:   Dementia    FAMILY HISTORY:  No history of MI in first degree relatives  Unable to obtain due to encephalopathy    ITEMS NOT CHECKED ARE NOT PRESENT  SOCIAL HISTORY:   Significant other/partner:  []  Children:  [x]  Substance hx:  []   Tobacco hx:  []   Alcohol hx: []   Home Opioid hx:  [] I-Stop Reference No:  - no active Rx's  Living Situation: [x]Home  []Long term care  []Rehab []Other  Anabaptist/Spiritual practice: ; Role of organized Restoration [] important [x] some [] unable to assess  Coping: [] well [x] with difficulty [] poor coping [] unable to assess  Support system: [] strong [x] adequate [] inadequate    ADVANCE DIRECTIVES:    []MOLST  []Living Will  DECISION MAKER(s):  [] Health Care Proxy(s)  [x] Surrogate(s)  [] Guardian           Name(s)/Phone Number(s): Elmer Stewart, [phone]    BASELINE (I)ADLs (prior to admission):  [place]: []Total  [] Moderate [x]Dependent    ALLERGIES:  No Known Allergies    MEDICATIONS  (STANDING):  aspirin enteric coated 81 milliGRAM(s) Oral daily  atorvastatin 80 milliGRAM(s) Oral at bedtime  melatonin 3 milliGRAM(s) Oral at bedtime  metoprolol succinate ER 25 milliGRAM(s) Oral daily  NIFEdipine XL 30 milliGRAM(s) Oral every 12 hours    MEDICATIONS  (PRN):  OLANZapine Injectable 2.5 milliGRAM(s) IntraMuscular every 12 hours PRN agitation    Analgesic Use (Scheduled and PRNs) for past 24 hours:  - none    PRESENT SYMPTOMS: []Unable to obtain due to poor mentation/encephalopathy  Source if other than patient:  []Family   []Team     Pain: [] yes [x] no  QOL impact -   Location -                    Aggravating Factors -  Quality -  Radiation -  Timing -  Severity (0-10 scale) -   Minimal Acceptable Level (0-10 scale) -    Dyspnea:                           []Mild  []Moderate []Severe  Anxiety:                             []Mild []Moderate []Severe  Fatigue:                             []Mild []Moderate []Severe  Nausea:                             []Mild []Moderate []Severe  Loss of Appetite:              []Mild []Moderate []Severe  Constipation:                    []Mild []Moderate []Severe    Other Symptoms:  [x]All other review of systems negative     Palliative Performance Status Version 2:  50%  (Functional Assessment Tool)    GENERAL:  [x] NAD [x]Alert []Lethargic  []Cachexia  []Unarousable  [x]Verbal  []Non-Verbal  BEHAVIORAL:   []Anxiety  []Delirium []Agitation [x]Cooperative [x]Oriented x2  HEENT:  []Normal  [] Moist Mucous Membranes []Dry mouth   []ET Tube/Trach  []Oral lesions  PULMONARY:   [x]Clear []Tachypnea  []Audible excessive secretions  [x]Normal Work of Breathing []Labored Breathing  []Rhonchi []Crackles []Wheezing  CARDIOVASCULAR:    [x]Regular Rate [x]Regular Rhythm []Irregular []Tachy  []Raul  GASTROINTESTINAL:  [x]Soft  []Distended   [x]+BS  [x]Non tender []Tender  []PEG []OGT/ NGT  Last BM:  GENITOURINARY:  [x]Normal [] Incontinent   []Oliguria/Anuria   []Waterman  MUSCULOSKELETAL:   []Normal Extremities  [x]Weakness  []Bed/Wheelchair bound []Edema  NEUROLOGIC:   []No focal deficits  [x]Cognitive impairment  []Dysphagia []Dysarthria []Paresis []Encephalopathic  SKIN:   [x]Normal   []Pressure ulcer(s)  []Rash    Vital Signs Last 24 Hrs  T(C): 36.4 (20 May 2024 08:15), Max: 37 (19 May 2024 21:19)  T(F): 97.6 (20 May 2024 08:15), Max: 98.6 (19 May 2024 21:19)  HR: 68 (20 May 2024 11:50) (68 - 110)  BP: 159/83 (20 May 2024 11:50) (124/85 - 233/151)  BP(mean): 109 (20 May 2024 06:10) (91 - 178)  RR: 18 (20 May 2024 11:50) (15 - 18)  SpO2: 99% (20 May 2024 11:50) (95% - 99%)    Parameters below as of 20 May 2024 11:50  Patient On (Oxygen Delivery Method): room air     LABS: Personally reviewed and interpreted                      12.1   7.13  )-----------( 116      ( 20 May 2024 05:30 )             36.4   05-20    143  |  103  |  28<H>  ----------------------------<  88  3.1<L>   |  25  |  1.34<H>    Ca    9.1      20 May 2024 05:30  Phos  3.6     05-19  Mg     2.1     05-20  TPro  7.0  /  Alb  3.5  /  TBili  1.1  /  DBili  x   /  AST  36  /  ALT  22  /  AlkPhos  59  05-20    RADIOLOGY & ADDITIONAL STUDIES: Personally reviewed and interpreted  < from: TTE Echo Complete w/o Contrast w/ Doppler (05.20.24 @ 13:46) >   1. Mildly reduced left ventricular systolic function. LVEF 51%.   2. Regional wall motion abnormalities consistent with ischemic heart disease.   3. Normal right ventricular size.   4. Normal right ventricular systolic function.   5. Mild tricuspid regurgitation.   6. Aortic sclerosis without significant stenosis.   7. No evidence of pulmonary hypertension.   8. No pericardial effusion.    REFERRALS:  [x]Social Work/Case management [x]PT/OT []Chaplaincy  []Hospice  []Patient/Family Support []Massage Therapy [x]Music Therapy []Holistic RN []Ethics    DISCUSSION OF CASE: Son - to provide updates and emotional support; Primary Team/RN - to discuss plan of care

## 2024-05-20 NOTE — DIETITIAN INITIAL EVALUATION ADULT - PERTINENT MEDS FT
MEDICATIONS  (STANDING):  aspirin enteric coated 81 milliGRAM(s) Oral daily  atorvastatin 80 milliGRAM(s) Oral at bedtime  heparin  Infusion.  Unit(s)/Hr (5 mL/Hr) IV Continuous <Continuous>  hydrALAZINE Injectable 5 milliGRAM(s) IV Push every 8 hours  losartan 25 milliGRAM(s) Oral daily  melatonin 3 milliGRAM(s) Oral at bedtime  metoprolol tartrate Injectable 2.5 milliGRAM(s) IV Push every 6 hours    MEDICATIONS  (PRN):  OLANZapine Injectable 2.5 milliGRAM(s) IntraMuscular every 12 hours PRN agitation

## 2024-05-20 NOTE — PHYSICAL THERAPY INITIAL EVALUATION ADULT - ADDITIONAL COMMENTS
according to Care Coordination Assessment, patient lives in first floor [facility], with 5 steps to enter

## 2024-05-20 NOTE — DIETITIAN INITIAL EVALUATION ADULT - OTHER CALCULATIONS
Current body wt used for energy calculations as pt falls within % IBW  Adjusted for age, Malnutrition/ BMI

## 2024-05-20 NOTE — PHYSICAL THERAPY INITIAL EVALUATION ADULT - PERTINENT HX OF CURRENT PROBLEM, REHAB EVAL
89F BIBEMS to [facility] ED 5/18/24 for increased lethargy, decreased PO intake and vomitting for the last week and was found to be HTN SBP 200s as well as ISHMAEL on EKG with subsequent STEMI code called in [facility] ED. Cardiology Fellow at bedside who performed bedside TTE revealing decreased EF as well as global hypokinesia. Patient chest pain free.

## 2024-05-20 NOTE — DIETITIAN INITIAL EVALUATION ADULT - OTHER INFO
89yo Italian-Creole speaking, F PMHx dementia A&O x 2 who was BIBEMS to [facility] ED 5/18/24 for increased lethargy, decreased PO intake and vomitting for the last week and was found to be HTN SBP 200s as well as ISHMAEL on EKG with subsequent STEMI code called in [facility] ED. Pt not a cath candidate as she is asymptomatic, poor functional status and dementia therefore pt admitted to 5 uris for medical management - course complicated by Sundowning and refusal of care. Pallative consulted and plan for medical management and GOC with son.     Pt seen this AM on 5UR. One to one and RN present. /83. Ordered for hydralazine, metoprolol. Diet: DASH TLC, soft-bite sized. Reported pt did not consume much this AM and appeared to pocket oatmeal. Was able to consume some bites of banana without noted issues. Pt is noted with poor dentition, does not wear dentures. Son however Denies issues chewing or swallowing. This AM PCA felt pt to benefit from change to puree diet.    ** MALNUTRITION 5/20 **   >> PO INTAKE: Son does cooking PTA/ reports pt consumes sweet plantains, beans, whole wheat bread/butter, crackers, corn, chicken, oatmeal, pizza, kristin cheese + potatoes chips, rice. Sometimes takes ensure - will consume with adequate encouragement. NKFA. Despite liking various items, son reports pt sleeping more during the day and having less time to eat. Per H&P noted "decreased PO intake and vomiting for the last week." Assume pt is not meeting ~75% EER consistently PTA.   >> WT LOSS: Son unable to provide recent wt. Could report pt has lost wt from  pounds x3-4 years ago. Noted current wt of 83 pounds which does confirm a wt loss of 37 pounds/31% Body wt loss over an extended time period.   >> Noted with wasting/loss to temples (mild).     Please see below for nutritions recommendations. Recommendations Provided to team.

## 2024-05-20 NOTE — PROGRESS NOTE ADULT - PROBLEM SELECTOR PLAN 1
Labs remarkable for Trop 665->725-->529, CK 1950, CKMB 24.5 -> will trend Trop to peak\  s/p ASA Load in ER, not PLAVIX Loaded at this time pending head CT given some ?AMS and Hypertensive to the 200s on admission with ? fall and pending CT head before plavix load.   - EKG ISHMAEL V3-V5, patient chest pain free, HDS, on room air  - TTE revealing decreased EF as well as global hypokinesia per CCU Fellow bedside echo. full report ordered   - Official TTE ordered   - Heparin gtt for 48 hours started around 2pm on 5/18, plan to stop tomr 5/19 see chart note event as heparin was stopped for period of time   - f/u TSH, T4, lipid panel, A1c in AM patient refused Labs remarkable for Trop 665->725-->529, CK 1950, CKMB 24.5 -> will trend Trop to peak  s/p ASA Load in ER, not PLAVIX Loaded at this time pending head CT given some ?AMS and Hypertensive to the 200s on admission with ? fall and pending CT head before plavix load.   - EKG ISHMAEL V3-V5, patient chest pain free, HDS, on room air  - TTE (5/20/24): LVEF 51%, RWMA, mild TR  - dc Heparin gtt as pt refusing PTT and pt asx  - TSH nml, Free thyroxine nml,   - Continue Toprol 25 QD, Aspirin 80 QHS, ASA 81 daily and Plavix 75 daily

## 2024-05-20 NOTE — PROGRESS NOTE ADULT - SUBJECTIVE AND OBJECTIVE BOX
Interventional Cardiology PA Adult Progress Note    Overnight Events:  Patient intermittently refusing care    Subjective Assessment:  Patient able to follow commands. A&O to name only. Denied chest pain and SOB.    ROS Negative except as per Subjective and HPI  	  MEDICATIONS:  metoprolol succinate ER 25 milliGRAM(s) Oral daily  NIFEdipine XL 30 milliGRAM(s) Oral every 12 hours  melatonin 3 milliGRAM(s) Oral at bedtime  OLANZapine Injectable 2.5 milliGRAM(s) IntraMuscular every 12 hours PRN  atorvastatin 80 milliGRAM(s) Oral at bedtime  aspirin enteric coated 81 milliGRAM(s) Oral daily    [PHYSICAL EXAM:  TELEMETRY:  T(C): 36.4 (05-20-24 @ 08:15), Max: 37 (05-19-24 @ 21:19)  HR: 68 (05-20-24 @ 11:50) (68 - 110)  BP: 159/83 (05-20-24 @ 11:50) (124/85 - 233/151)  RR: 18 (05-20-24 @ 11:50) (15 - 18)  SpO2: 99% (05-20-24 @ 11:50) (95% - 99%)  Wt(kg): --  I&O's Summary    19 May 2024 07:01  -  20 May 2024 07:00  --------------------------------------------------------  IN: 0 mL / OUT: 0 mL / NET: 0 mL    20 May 2024 07:01  -  20 May 2024 17:29  --------------------------------------------------------  IN: 0 mL / OUT: 0 mL / NET: 0 mL    Weight (kg): 37.7 (05-19 @ 21:19)                                   Appearance: Normal	  HEENT:   Normal oral mucosa, PERRL, EOMI	  Neck: Supple,  - JVD  Cardiovascular: Normal S1 S2, No JVD, No murmurs,   Respiratory: Lungs clear to auscultation  Gastrointestinal:  Soft, Non-tender, + BS	  Skin: No rashes, No ecchymoses, No cyanosis  Extremities: Normal range of motion, No clubbing, cyanosis or edema  Vascular: Peripheral pulses palpable 2+ bilaterally  Neurologic: Non-focal  Psychiatry: A & O x 3, Mood & affect appropriate  	  CARDIAC MARKERS:                        12.1   7.13  )-----------( 116      ( 20 May 2024 05:30 )             36.4     05-20    143  |  103  |  28<H>  ----------------------------<  88  3.1<L>   |  25  |  1.34<H>    Ca    9.1      20 May 2024 05:30  Phos  3.6     05-19  Mg     2.1     05-20    TPro  7.0  /  Alb  3.5  /  TBili  1.1  /  DBili  x   /  AST  36  /  ALT  22  /  AlkPhos  59  05-20    proBNP:   Lipid Profile:   HgA1c:   TSH: Thyroid Stimulating Hormone, Serum: 2.260 uIU/mL (05-19 @ 19:46)    PT/INR - ( 19 May 2024 19:46 )   PT: 10.7 sec;   INR: 0.94          PTT - ( 20 May 2024 06:19 )  PTT:39.9 sec

## 2024-05-20 NOTE — DIETITIAN INITIAL EVALUATION ADULT - PERTINENT LABORATORY DATA
05-20    143  |  103  |  28<H>  ----------------------------<  88  3.1<L>   |  25  |  1.34<H>    Ca    9.1      20 May 2024 05:30  Phos  3.6     05-19  Mg     2.1     05-20    TPro  7.0  /  Alb  3.5  /  TBili  1.1  /  DBili  x   /  AST  36  /  ALT  22  /  AlkPhos  59  05-20  A1C with Estimated Average Glucose Result: 5.6 % (05-19-24 @ 19:46)   CHOL 296, NON ,

## 2024-05-20 NOTE — CONSULT NOTE ADULT - PROBLEM SELECTOR RECOMMENDATION 5
.  Patient is DNR/DNI, MOLST in chart  -son is surrogate decision maker  -see GOC note    In addition to the E/M visit, an advance care planning meeting was performed. Start time: 3:10PM; End time: 3:26PM; Total time: 16min. A face to face meeting to discuss advance care planning was held today regarding: JACK STEWART. Primary decision maker: Patient is unable to participate in decision making; Alternate/surrogate: Elmer Stewart. Discussed advance directives including, but not limited to, healthcare proxy and code status. Decision regarding code status: DNR/DNI; Documentation completed today: MOLST GOC note

## 2024-05-20 NOTE — DIETITIAN INITIAL EVALUATION ADULT - PROBLEM SELECTOR PLAN 1
Labs remarkable for Trop 665->725, CK 1950, CKMB 24.5 -> will trend Trop to peak  - EKG ISHMAEL V3-V5, patient chest pain free, HDS, on room air  - TTE revealing decreased EF as well as global hypokinesia  - will need repeat official TTE  - Heparin gtt for 48 hours  - continuous pulse ox, tele monitoring, vitals q4  - f/u TSH, T4, lipid panel, A1c in AM  - will start Lopressor 12.5 mg PO BID and Lipitor 80 QHS    #Hypertensive Emergency  SBP 200s on admit  - goal -180s 5/18/24 and <140s 5/19/24    F: None  E: Replete if K<4 or Mag<2  N: DASH Diet  GIppx: pantoprazole 40 qd  VTEppx: heparin gtt  Dispo: after heparin gtt for 48 hours  PT: ordered

## 2024-05-20 NOTE — CONSULT NOTE ADULT - ASSESSMENT
·	MOLST completed with DNR/DNI after discussion with son (surrogate)  ·	does not definitively meet hospice criteria given preserved EF, hemodynamic stability, and lack of symptoms  ·	son is concerned he can no longer manage the patient at home, looking for [facility] as a bridge to LTC (another barrier to home hospice)    In addition to the E/M visit, an advance care planning meeting was performed. Start time: 3:10PM; End time: 3:26PM; Total time: 16min. A face to face meeting to discuss advance care planning was held today regarding: JACK STEWART. Primary decision maker: Patient is unable to participate in decision making; Alternate/surrogate: Elmer Stewart. Discussed advance directives including, but not limited to, healthcare proxy and code status. Decision regarding code status: DNR/DNI; Documentation completed today: MOLST [facility] note 89yo F with PMH of Dementia and AFib p/w vomiting and found to have hypertensive emergency c/b STEMI. Palliative consulted for complex medical decision making in the setting of advanced illness.    ·	MOLST completed with DNR/DNI after discussion with son (surrogate)  ·	does not definitively meet hospice criteria given preserved EF, hemodynamic stability, and lack of symptoms  ·	son is concerned he can no longer manage the patient at home, looking for [facility] as a bridge to LTC (another barrier to home hospice)

## 2024-05-20 NOTE — CONSULT NOTE ADULT - PROBLEM SELECTOR RECOMMENDATION 6
.  Complex medical decision making / symptom management in the setting of advanced illness.    Will continue to follow for ongoing GOC discussion / titration of palliative regimen. Emotional support provided, questions answered.  Active Psychosocial Referrals:  [x]Social Work/Case management [x]PT/OT []Chaplaincy []Hospice  []Patient/Family Support []Holistic RN []Massage Therapy [x]Music Therapy []Ethics  Coping: [] well [x] with difficulty [] poor coping [] unable to assess  Support system: [] strong [x] adequate [] inadequate    For new or uncontrolled symptoms, please call Palliative Care at [phone]). The service is available 24/7 (including nights & weekends) to provide symptom management recommendations over the phone as appropriate

## 2024-05-20 NOTE — DIETITIAN NUTRITION RISK NOTIFICATION - ADDITIONAL COMMENTS/DIETITIAN RECOMMENDATIONS
** MALNUTRITION 5/20 **   >> PO INTAKE: Son does cooking PTA/ reports pt consumes sweet plantains, beans, whole wheat bread/butter, crackers, corn, chicken, oatmeal, pizza, kristin cheese + potatoes chips, rice. Sometimes takes ensure - will consume with adequate encouragement. NKFA. Despite liking various items, son reports pt sleeping more during the day and having less time to eat. Per H&P noted "decreased PO intake and vomiting for the last week." Assume pt is not meeting ~75% EER consistently PTA.   >> WT LOSS: Son unable to provide recent wt. Could report pt has lost wt from  pounds x3-4 years ago. Noted current wt of 83 pounds which does confirm a wt loss of 37 pounds/31% Body wt loss over an extended time period.   >> Noted with wasting/loss to temples (mild).     ** Please see RD note for further details and Recommendations **

## 2024-05-21 LAB
ALBUMIN SERPL ELPH-MCNC: 3.4 G/DL — SIGNIFICANT CHANGE UP (ref 3.3–5)
ALP SERPL-CCNC: 52 U/L — SIGNIFICANT CHANGE UP (ref 40–120)
ALT FLD-CCNC: 18 U/L — SIGNIFICANT CHANGE UP (ref 10–45)
ANION GAP SERPL CALC-SCNC: 11 MMOL/L — SIGNIFICANT CHANGE UP (ref 5–17)
AST SERPL-CCNC: 24 U/L — SIGNIFICANT CHANGE UP (ref 10–40)
BILIRUB SERPL-MCNC: 0.7 MG/DL — SIGNIFICANT CHANGE UP (ref 0.2–1.2)
BUN SERPL-MCNC: 45 MG/DL — HIGH (ref 7–23)
CALCIUM SERPL-MCNC: 9.4 MG/DL — SIGNIFICANT CHANGE UP (ref 8.4–10.5)
CHLORIDE SERPL-SCNC: 104 MMOL/L — SIGNIFICANT CHANGE UP (ref 96–108)
CO2 SERPL-SCNC: 26 MMOL/L — SIGNIFICANT CHANGE UP (ref 22–31)
CREAT SERPL-MCNC: 1.75 MG/DL — HIGH (ref 0.5–1.3)
EGFR: 26 ML/MIN/1.73M2 — LOW
GLUCOSE SERPL-MCNC: 138 MG/DL — HIGH (ref 70–99)
HCT VFR BLD CALC: 34.9 % — SIGNIFICANT CHANGE UP (ref 34.5–45)
HGB BLD-MCNC: 12 G/DL — SIGNIFICANT CHANGE UP (ref 11.5–15.5)
MAGNESIUM SERPL-MCNC: 2.3 MG/DL — SIGNIFICANT CHANGE UP (ref 1.6–2.6)
MCHC RBC-ENTMCNC: 31.2 PG — SIGNIFICANT CHANGE UP (ref 27–34)
MCHC RBC-ENTMCNC: 34.4 GM/DL — SIGNIFICANT CHANGE UP (ref 32–36)
MCV RBC AUTO: 90.6 FL — SIGNIFICANT CHANGE UP (ref 80–100)
NRBC # BLD: 0 /100 WBCS — SIGNIFICANT CHANGE UP (ref 0–0)
PLATELET # BLD AUTO: 123 K/UL — LOW (ref 150–400)
POTASSIUM SERPL-MCNC: 3 MMOL/L — LOW (ref 3.5–5.3)
POTASSIUM SERPL-SCNC: 3 MMOL/L — LOW (ref 3.5–5.3)
PROT SERPL-MCNC: 7 G/DL — SIGNIFICANT CHANGE UP (ref 6–8.3)
RBC # BLD: 3.85 M/UL — SIGNIFICANT CHANGE UP (ref 3.8–5.2)
RBC # FLD: 14 % — SIGNIFICANT CHANGE UP (ref 10.3–14.5)
SODIUM SERPL-SCNC: 141 MMOL/L — SIGNIFICANT CHANGE UP (ref 135–145)
WBC # BLD: 6.74 K/UL — SIGNIFICANT CHANGE UP (ref 3.8–10.5)
WBC # FLD AUTO: 6.74 K/UL — SIGNIFICANT CHANGE UP (ref 3.8–10.5)

## 2024-05-21 PROCEDURE — 99233 SBSQ HOSP IP/OBS HIGH 50: CPT

## 2024-05-21 RX ORDER — HYDRALAZINE HCL 50 MG
5 TABLET ORAL ONCE
Refills: 0 | Status: DISCONTINUED | OUTPATIENT
Start: 2024-05-21 | End: 2024-05-21

## 2024-05-21 RX ORDER — HYDRALAZINE HCL 50 MG
10 TABLET ORAL ONCE
Refills: 0 | Status: COMPLETED | OUTPATIENT
Start: 2024-05-21 | End: 2024-05-21

## 2024-05-21 RX ORDER — AMLODIPINE BESYLATE 2.5 MG/1
10 TABLET ORAL EVERY 24 HOURS
Refills: 0 | Status: DISCONTINUED | OUTPATIENT
Start: 2024-05-21 | End: 2024-05-21

## 2024-05-21 RX ORDER — POTASSIUM CHLORIDE 20 MEQ
20 PACKET (EA) ORAL
Refills: 0 | Status: COMPLETED | OUTPATIENT
Start: 2024-05-21 | End: 2024-05-21

## 2024-05-21 RX ORDER — OLANZAPINE 15 MG/1
2.5 TABLET, FILM COATED ORAL ONCE
Refills: 0 | Status: COMPLETED | OUTPATIENT
Start: 2024-05-21 | End: 2024-05-22

## 2024-05-21 RX ORDER — SODIUM CHLORIDE 9 MG/ML
1000 INJECTION INTRAMUSCULAR; INTRAVENOUS; SUBCUTANEOUS
Refills: 0 | Status: DISCONTINUED | OUTPATIENT
Start: 2024-05-21 | End: 2024-05-22

## 2024-05-21 RX ORDER — METOPROLOL TARTRATE 50 MG
12.5 TABLET ORAL EVERY 12 HOURS
Refills: 0 | Status: DISCONTINUED | OUTPATIENT
Start: 2024-05-21 | End: 2024-05-22

## 2024-05-21 RX ORDER — AMLODIPINE BESYLATE 2.5 MG/1
10 TABLET ORAL EVERY 24 HOURS
Refills: 0 | Status: DISCONTINUED | OUTPATIENT
Start: 2024-05-21 | End: 2024-05-22

## 2024-05-21 RX ORDER — LABETALOL HCL 100 MG
10 TABLET ORAL ONCE
Refills: 0 | Status: COMPLETED | OUTPATIENT
Start: 2024-05-21 | End: 2024-05-21

## 2024-05-21 RX ORDER — METOPROLOL TARTRATE 50 MG
2.5 TABLET ORAL ONCE
Refills: 0 | Status: COMPLETED | OUTPATIENT
Start: 2024-05-21 | End: 2024-05-21

## 2024-05-21 RX ADMIN — ATORVASTATIN CALCIUM 80 MILLIGRAM(S): 80 TABLET, FILM COATED ORAL at 01:00

## 2024-05-21 RX ADMIN — Medication 50 MILLIEQUIVALENT(S): at 17:45

## 2024-05-21 RX ADMIN — Medication 12.5 MILLIGRAM(S): at 13:04

## 2024-05-21 RX ADMIN — Medication 100 MILLIEQUIVALENT(S): at 00:59

## 2024-05-21 RX ADMIN — OLANZAPINE 2.5 MILLIGRAM(S): 15 TABLET, FILM COATED ORAL at 18:14

## 2024-05-21 RX ADMIN — Medication 10 MILLIGRAM(S): at 20:00

## 2024-05-21 RX ADMIN — APIXABAN 2.5 MILLIGRAM(S): 2.5 TABLET, FILM COATED ORAL at 07:50

## 2024-05-21 RX ADMIN — Medication 30 MILLIGRAM(S): at 07:50

## 2024-05-21 RX ADMIN — SODIUM CHLORIDE 250 MILLILITER(S): 9 INJECTION INTRAMUSCULAR; INTRAVENOUS; SUBCUTANEOUS at 17:45

## 2024-05-21 RX ADMIN — Medication 3 MILLIGRAM(S): at 01:01

## 2024-05-21 RX ADMIN — APIXABAN 2.5 MILLIGRAM(S): 2.5 TABLET, FILM COATED ORAL at 01:01

## 2024-05-21 RX ADMIN — Medication 81 MILLIGRAM(S): at 11:00

## 2024-05-21 RX ADMIN — Medication 50 MILLIEQUIVALENT(S): at 15:42

## 2024-05-21 RX ADMIN — Medication 3 MILLIGRAM(S): at 23:13

## 2024-05-21 RX ADMIN — Medication 2.5 MILLIGRAM(S): at 02:42

## 2024-05-21 RX ADMIN — Medication 25 MILLIGRAM(S): at 07:50

## 2024-05-21 RX ADMIN — Medication 50 MILLIEQUIVALENT(S): at 20:00

## 2024-05-21 NOTE — PROGRESS NOTE ADULT - PROBLEM SELECTOR PLAN 3
.  FAST 6+, still ambulatory with assistance  -does not meet hospice criteria (still ambulatory, no pressure wounds, normal albumin, no recurrent infections/hospitalizations)

## 2024-05-21 NOTE — DISCHARGE NOTE PROVIDER - HOSPITAL COURSE
Patient is a 89F with Dementia, Chronic Afib on Eliquis BIBEMS to [facility] ED 5/18/24 for increased lethargy, decreased PO intake and vomiting.   Vitals notable for hypertensive emergency and EKG c/f STEMI.  STEMI code called in [facility] ED, patient chest pain free, troponin elevated    IC team evaluated and deemed patient not to be candidate for cath given poor functional status, dementia and asymptomatic status.   Patient admitted for medical management.   Afib noted on monitor, initiated on A/C with Eliquis   ALMA on labs, very poor po intake, requires assisted feeds as patient will not take po without prompting  TTE LVEF 51% with RWMA c/w ischemic heart disease, Euvolemic on Exam  CT Head performed 5/19/2024 negative for bleed No acute intracranial hemorrhage, mass effect or demarcated territorial   infarction. Chronic volume loss and microvascular ischemia.    Pallative was Consulted on admission and discussion had with son and JASON signed made DNR/DNI    Medical Management Eliquis 2.5mg, ASA 81, Atorva 80, Lopressor 12.5mg BID, Norvasc 10mg     Patient was accepted to [facility], on morning of discharge patient appearing Calmer was able to take Meds this morning. Son made aware and will plan to discharge to Rehab today    DNR/DNI code status confirmed, jason updated in Chart

## 2024-05-21 NOTE — PROGRESS NOTE ADULT - PROBLEM SELECTOR PLAN 2
Per son is on home Metoprolol and Eliquis at home per son  - AC: Eliquis 2.5 BID  - Rate control: Toprol 25 QD    F: None  E: Replete if K<4 or Mag<2  N: DASH Diet  VTEppx: Eliquis  Dispo: awaiting auth  PT: rec [facility]

## 2024-05-21 NOTE — PROGRESS NOTE ADULT - SUBJECTIVE AND OBJECTIVE BOX
Interventional Cardiology PA Adult Progress Note      Subjective Assessment:  Patient seen and examined at bedside. Patient denied any acute symptoms.    ROS Negative except as per Subjective and HPI  	  MEDICATIONS:  amLODIPine   Tablet 10 milliGRAM(s) Oral every 24 hours  hydrALAZINE 5 milliGRAM(s) Oral once  metoprolol tartrate 12.5 milliGRAM(s) Oral every 12 hours  melatonin 3 milliGRAM(s) Oral at bedtime  OLANZapine Injectable 2.5 milliGRAM(s) IntraMuscular every 12 hours PRN  atorvastatin 80 milliGRAM(s) Oral at bedtime  apixaban 2.5 milliGRAM(s) Oral every 12 hours  aspirin enteric coated 81 milliGRAM(s) Oral daily  potassium chloride  20 mEq/100 mL IVPB 20 milliEquivalent(s) IV Intermittent every 2 hours  sodium chloride 0.9%. 1000 milliLiter(s) IV Continuous <Continuous>        [PHYSICAL EXAM:  TELEMETRY:  T(C): 36.4 (05-21-24 @ 12:22), Max: 36.6 (05-20-24 @ 21:55)  HR: 70 (05-21-24 @ 17:26) (62 - 122)  BP: 189/80 (05-21-24 @ 17:26) (114/61 - 193/94)  RR: 18 (05-21-24 @ 17:26) (17 - 18)  SpO2: 97% (05-21-24 @ 17:26) (95% - 100%)  Wt(kg): --  I&O's Summary    20 May 2024 07:01  -  21 May 2024 07:00  --------------------------------------------------------  IN: 0 mL / OUT: 0 mL / NET: 0 mL    21 May 2024 07:01  -  21 May 2024 19:16  --------------------------------------------------------  IN: 520 mL / OUT: 425 mL / NET: 95 mL      Height (cm): 154.9 (05-21 @ 08:56)                                     Appearance: Normal	  HEENT:   Normal oral mucosa, PERRL, EOMI	  Neck: Supple, + JVD/ - JVD; Carotid Bruit   Cardiovascular: Normal S1 S2, No JVD, No murmurs,   Respiratory: Lungs clear to auscultation/Decreased Breath Sounds/No Rales, Rhonchi, Wheezing	  Gastrointestinal:  Soft, Non-tender, + BS	  Skin: No rashes, No ecchymoses, No cyanosis  Extremities: Normal range of motion, No clubbing, cyanosis or edema  Vascular: Peripheral pulses palpable 2+ bilaterally  Neurologic: Non-focal  Psychiatry: A & O x 3, Mood & affect appropriate  	  CARDIAC MARKERS:                                  12.0   6.74  )-----------( 123      ( 21 May 2024 10:00 )             34.9     05-21    141  |  104  |  45<H>  ----------------------------<  138<H>  3.0<L>   |  26  |  1.75<H>    Ca    9.4      21 May 2024 10:00  Phos  3.6     05-19  Mg     2.3     05-21    TPro  7.0  /  Alb  3.4  /  TBili  0.7  /  DBili  x   /  AST  24  /  ALT  18  /  AlkPhos  52  05-21    proBNP:   Lipid Profile:   HgA1c:   TSH:   PT/INR - ( 19 May 2024 19:46 )   PT: 10.7 sec;   INR: 0.94          PTT - ( 20 May 2024 06:19 )  PTT:39.9 sec

## 2024-05-21 NOTE — PROGRESS NOTE ADULT - NUTRITIONAL ASSESSMENT
This patient has been assessed with a concern for Malnutrition and has been determined to have a diagnosis/diagnoses of Moderate protein-calorie malnutrition.    This patient is being managed with:   Diet DASH/TLC-  Sodium & Cholesterol Restricted  Pureed (PUREED)  Entered: May 20 2024  9:13AM  
This patient has been assessed with a concern for Malnutrition and has been determined to have a diagnosis/diagnoses of Moderate protein-calorie malnutrition.    This patient is being managed with:   Diet DASH/TLC-  Sodium & Cholesterol Restricted  Pureed (PUREED)  Entered: May 20 2024  9:13AM

## 2024-05-21 NOTE — PROGRESS NOTE ADULT - ASSESSMENT
·	continued reinforcement of potential disease trajectories for patient's family 89yo F with PMH of Dementia and AFib p/w vomiting and found to have hypertensive emergency c/b STEMI. Palliative consulted for complex medical decision making in the setting of advanced illness.    ·	continued reinforcement of potential disease trajectories for patient's family

## 2024-05-21 NOTE — PROGRESS NOTE ADULT - ASSESSMENT
89yo [place] sorbian-Creole speaking F DNR/DNI PMHx dementia A&O x 2 (place and name, at baseline), Afib BIBEMS to [facility] ED 5/18/24 for increased lethargy, decreased PO intake and vomiting found to be HTN SBP 200s as well as ISHMAEL on EKG with subsequent STEMI code called in [facility] ED. Patient not a cath candidate as she is asymptomatic, poor functional status and dementia therefore patient admitted to 5 uris for medical management course complicated by sundowning and refusal of care. Pallative consulted, pt made DNR/DNI. PT rec [facility] as bridge to LTC. Awaiting auth to [facility].

## 2024-05-21 NOTE — PROGRESS NOTE ADULT - SUBJECTIVE AND OBJECTIVE BOX
[facility] Geriatrics and Palliative Care  Sagar Andrews, Palliative Care Attending  Contact Info: Call [phone] (HEAL Line) or message on Microsoft Teams (Sagar Andrews)    SUBJECTIVE AND OBJECTIVE:  INTERVAL HPI/OVERNIGHT EVENTS: Interval events noted. See patient's PRN use for the past 24hrs noted below. Comprehensive symptom assessment and GOC exploration as noted below. Extensive time spent discussing plan of care with family.    ALLERGIES:  No Known Allergies    MEDICATIONS  (STANDING):  amLODIPine   Tablet 10 milliGRAM(s) Oral every 24 hours  apixaban 2.5 milliGRAM(s) Oral every 12 hours  aspirin enteric coated 81 milliGRAM(s) Oral daily  atorvastatin 80 milliGRAM(s) Oral at bedtime  hydrALAZINE Injectable 10 milliGRAM(s) IV Push once  melatonin 3 milliGRAM(s) Oral at bedtime  metoprolol tartrate 12.5 milliGRAM(s) Oral every 12 hours  potassium chloride  20 mEq/100 mL IVPB 20 milliEquivalent(s) IV Intermittent every 2 hours  sodium chloride 0.9%. 1000 milliLiter(s) (250 mL/Hr) IV Continuous <Continuous>    MEDICATIONS  (PRN):  OLANZapine Injectable 2.5 milliGRAM(s) IntraMuscular every 12 hours PRN agitation      Analgesic Use (Scheduled and PRNs) for past 24 hours:    melatonin   3 milliGRAM(s) Oral (05-21-24 @ 01:01)    OLANZapine Injectable   2.5 milliGRAM(s) IntraMuscular (05-21-24 @ 18:14)      ITEMS UNCHECKED ARE NOT PRESENT  PRESENT SYMPTOMS/REVIEW OF SYSTEMS: []Unable to obtain due to poor mentation   Source if other than patient:  []Family   []Team         Vital Signs Last 24 Hrs  T(C): 36.4 (21 May 2024 12:22), Max: 36.6 (20 May 2024 21:55)  T(F): 97.6 (21 May 2024 12:22), Max: 97.8 (20 May 2024 21:55)  HR: 70 (21 May 2024 17:26) (62 - 122)  BP: 189/80 (21 May 2024 17:26) (114/61 - 193/94)  BP(mean): 107 (21 May 2024 06:50) (107 - 134)  RR: 18 (21 May 2024 17:26) (17 - 18)  SpO2: 97% (21 May 2024 17:26) (95% - 100%)    Parameters below as of 21 May 2024 17:26  Patient On (Oxygen Delivery Method): room air        LABS: Personally reviewed and interpreted                        12.0   6.74  )-----------( 123      ( 21 May 2024 10:00 )             34.9   05-21    141  |  104  |  45<H>  ----------------------------<  138<H>  3.0<L>   |  26  |  1.75<H>    Ca    9.4      21 May 2024 10:00  Mg     2.3     05-21    TPro  7.0  /  Alb  3.4  /  TBili  0.7  /  DBili  x   /  AST  24  /  ALT  18  /  AlkPhos  52  05-21      RADIOLOGY & ADDITIONAL STUDIES: Personally reviewed and interpreted  None new    DISCUSSION OF CASE: Family - to provide updates and emotional support; Primary Team/RN - to discuss plan of care [facility] Geriatrics and Palliative Care  Sagar Andrews, Palliative Care Attending  Contact Info: Call [phone] (HEAL Line) or message on [employer] Teams (Sagar Andrews)    SUBJECTIVE AND OBJECTIVE:  INTERVAL HPI/OVERNIGHT EVENTS: Interval events noted. Patient awake and cooperative, denies any significant complaint. See patient's PRN use for the past 24hrs noted below. Comprehensive symptom assessment and GOC exploration as noted below. Extensive time spent discussing plan of care with family.    ALLERGIES:  No Known Allergies    MEDICATIONS  (STANDING):  amLODIPine   Tablet 10 milliGRAM(s) Oral every 24 hours  apixaban 2.5 milliGRAM(s) Oral every 12 hours  aspirin enteric coated 81 milliGRAM(s) Oral daily  atorvastatin 80 milliGRAM(s) Oral at bedtime  hydrALAZINE Injectable 10 milliGRAM(s) IV Push once  melatonin 3 milliGRAM(s) Oral at bedtime  metoprolol tartrate 12.5 milliGRAM(s) Oral every 12 hours  potassium chloride  20 mEq/100 mL IVPB 20 milliEquivalent(s) IV Intermittent every 2 hours  sodium chloride 0.9%. 1000 milliLiter(s) (250 mL/Hr) IV Continuous <Continuous>    MEDICATIONS  (PRN):  OLANZapine Injectable 2.5 milliGRAM(s) IntraMuscular every 12 hours PRN agitation    Analgesic Use (Scheduled and PRNs) for past 24 hours:  melatonin   3 milliGRAM(s) Oral (05-21-24 @ 01:01)  OLANZapine Injectable   2.5 milliGRAM(s) IntraMuscular (05-21-24 @ 18:14)    ITEMS UNCHECKED ARE NOT PRESENT  PRESENT SYMPTOMS/REVIEW OF SYSTEMS: []Unable to obtain due to poor mentation   Source if other than patient:  []Family   []Team     Pain: [] yes [x] no  QOL impact -   Location -                    Aggravating Factors -  Quality -  Radiation -  Timing -  Severity (0-10 scale) -   Minimal Acceptable Level (0-10 scale) -    Dyspnea:                           []Mild  []Moderate []Severe  Anxiety:                             []Mild []Moderate []Severe  Fatigue:                             []Mild []Moderate []Severe  Nausea:                             []Mild []Moderate []Severe  Loss of Appetite:              []Mild []Moderate []Severe  Constipation:                    []Mild []Moderate []Severe    Other Symptoms:  [x]All other review of systems negative     Palliative Performance Status Version 2:  50%  (Functional Assessment Tool)    GENERAL:  [x] NAD [x]Alert []Lethargic  []Cachexia  []Unarousable  [x]Verbal  []Non-Verbal  BEHAVIORAL:   []Anxiety  []Delirium []Agitation [x]Cooperative [x]Oriented x2  HEENT:  []Normal  [] Moist Mucous Membranes []Dry mouth   []ET Tube/Trach  []Oral lesions  PULMONARY:   [x]Clear []Tachypnea  []Audible excessive secretions  [x]Normal Work of Breathing []Labored Breathing  []Rhonchi []Crackles []Wheezing  CARDIOVASCULAR:    [x]Regular Rate [x]Regular Rhythm []Irregular []Tachy  []Raul  GASTROINTESTINAL:  [x]Soft  []Distended   [x]+BS  [x]Non tender []Tender  []PEG []OGT/ NGT  Last BM:  GENITOURINARY:  [x]Normal [] Incontinent   []Oliguria/Anuria   []Waterman  MUSCULOSKELETAL:   []Normal Extremities  [x]Weakness  []Bed/Wheelchair bound []Edema  NEUROLOGIC:   []No focal deficits  [x]Cognitive impairment  []Dysphagia []Dysarthria []Paresis []Encephalopathic  SKIN:   [x]Normal   []Pressure ulcer(s)  []Rash    Vital Signs Last 24 Hrs  T(C): 36.4 (21 May 2024 12:22), Max: 36.6 (20 May 2024 21:55)  T(F): 97.6 (21 May 2024 12:22), Max: 97.8 (20 May 2024 21:55)  HR: 70 (21 May 2024 17:26) (62 - 122)  BP: 189/80 (21 May 2024 17:26) (114/61 - 193/94)  BP(mean): 107 (21 May 2024 06:50) (107 - 134)  RR: 18 (21 May 2024 17:26) (17 - 18)  SpO2: 97% (21 May 2024 17:26) (95% - 100%)    Parameters below as of 21 May 2024 17:26  Patient On (Oxygen Delivery Method): room air    LABS: Personally reviewed and interpreted                      12.0   6.74  )-----------( 123      ( 21 May 2024 10:00 )             34.9   05-21    141  |  104  |  45<H>  ----------------------------<  138<H>  3.0<L>   |  26  |  1.75<H>    Ca    9.4      21 May 2024 10:00  Mg     2.3     05-21  TPro  7.0  /  Alb  3.4  /  TBili  0.7  /  DBili  x   /  AST  24  /  ALT  18  /  AlkPhos  52  05-21    RADIOLOGY & ADDITIONAL STUDIES: Personally reviewed and interpreted  None new    DISCUSSION OF CASE: Son - to provide updates and emotional support; Primary Team/RN - to discuss plan of care

## 2024-05-21 NOTE — PROGRESS NOTE ADULT - TIME BILLING
Emotional Support/Supportive Care and Clarification of Potential Disease Trajectory related to Cardiac Disease and Dementia  Assessment of Symptom [place] and Palliative regimen  Exploration of GOC/Advanced Directives including HCP designation, code status, and hospice eligibility    Time inclusive of chart review, medication ordering, discussion with primary team, clinical documentation, and communication with family/caregiver

## 2024-05-21 NOTE — DISCHARGE NOTE PROVIDER - NSDCMRMEDTOKEN_GEN_ALL_CORE_FT
Eliquis 2.5 mg oral tablet: 1 tab(s) orally 2 times a day  metoprolol:    amLODIPine 10 mg oral tablet: 1 tab(s) orally every 24 hours  apixaban 2.5 mg oral tablet: 1 tab(s) orally every 12 hours  aspirin 81 mg oral delayed release tablet: 1 tab(s) orally once a day  atorvastatin 80 mg oral tablet: 1 tab(s) orally once a day (at bedtime)  Metoprolol Tartrate 25 mg oral tablet: 0.5 tab(s) orally 2 times a day
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
## 2024-05-21 NOTE — DISCHARGE NOTE PROVIDER - NSDCCPCAREPLAN_GEN_ALL_CORE_FT
PRINCIPAL DISCHARGE DIAGNOSIS  Diagnosis: Acute myocardial infarction  Assessment and Plan of Treatment: You were found to have a heart attack on this admission in which you were medically treated using blood thinner, anti platelet medication and cholesterol medication. You underwent an Echo showing Moderately Reduced Ejection Fraction.      SECONDARY DISCHARGE DIAGNOSES  Diagnosis: HTN (hypertension)  Assessment and Plan of Treatment: You have a history of elevated blood pressure and you should continue your blood pressure medications as prescribed. You should continue to take your Lopressor 12.5mg Twice Daily as well as Amlodipine 10mg daily for Blood Pressure control.    Diagnosis: Afib  Assessment and Plan of Treatment: When you presented, your heart was in an irregular rhythm called atrial fibrillation. This means your atrium do not contract properly and blood does not efficiently get pumped into the ventricles.   This may lead to blood abnormally pooling in the ventricles and causing it to clot. This puts you at an increased risk for a stroke. Therefore, we started you on a heart rate controlling medication called Lopressor 12.5mg Twice Daily and a blood-thinning medication called Eliquis 2.5mg Twice Daily    Diagnosis: Encounter for palliative care  Assessment and Plan of Treatment: You were Seen by the Pallative Care team for a conversation about Goals of Care. In discussion with your Son and the team decision was made to have you DNR and DNI and Molst was performed.     PRINCIPAL DISCHARGE DIAGNOSIS  Diagnosis: Acute myocardial infarction  Assessment and Plan of Treatment: You were found to have a heart attack on this admission in which you were medically treated using blood thinner, anti platelet medication and cholesterol medication. You underwent an Echo as well for strucutral evaluation and showed a moderately reduced Ejection Fraction.  PLEASE CONTINUE ASPIRIN 81MG DAILY AND Eliquis 2.5MG DAILY. DO NOT STOP THESE MEDICATIONS and Continue Lipitor 80mg daily      For recurrent chest pain, please call your doctor or go to the emergency room.      SECONDARY DISCHARGE DIAGNOSES  Diagnosis: HTN (hypertension)  Assessment and Plan of Treatment: You have a history of elevated blood pressure and you should continue your blood pressure medications as prescribed. You should continue to take your Lopressor 12.5mg Twice Daily as well as Amlodipine 10mg daily for Blood Pressure control    Diagnosis: Afib  Assessment and Plan of Treatment: When you presented, your heart was in an irregular rhythm called atrial fibrillation. This means your atrium do not contract properly and blood does not efficiently get pumped into the ventricles.   This may lead to blood abnormally pooling in the ventricles and causing it to clot. This puts you at an increased risk for a stroke. Therefore, we started you on a heart rate controlling medication called Lopressor 12.5mg Twice Daily and a blood-thinning medication called Eliquis 2.5mg Twice Daily    Diagnosis: Encounter for palliative care  Assessment and Plan of Treatment: You were Seen by the Pallative Care team for a conversation about Goals of Care. In discussion with your Son and the team decision was made to have you DNR and DNI and Molst was performed.

## 2024-05-21 NOTE — PROGRESS NOTE ADULT - PROBLEM SELECTOR PROBLEM 1
STEMI (ST elevation myocardial infarction)
Debility

## 2024-05-21 NOTE — PROGRESS NOTE ADULT - NS ATTEND AMEND GEN_ALL_CORE FT
89F with Dementia, Chronic Afib on Eliquis BIBEMS to [facility] ED 5/18/24 for increased lethargy, decreased PO intake and vomiting. Vitals notable for hypertensive emergency and EKG c/f STEMI.  STEMI code called in [facility] ED, patient chest pain free, troponin  .  IC team evaluated and deemed patient not to be candidate for cath given poor functional status, dementia and asymptomatic status. Patient admitted for medical management. Initiated on heparin gtt, but patient intermittently refusing labs - hence rare PTTs. Patient minimally interactive, opens eyes to questions posed in [place] but does not respond.   TTE LVEF 51% with RWMA c/w ischemic heart disease  CT brain negative for bleed  Plan  DC heparin gtt given risk outweigh benefits, poor monitoring of PTT, asx status  CAD: ASA 81, Plavix 75 daily, Atorva 80 qHS, Toprol 25XL daily  HTN: Nifedipine 30 XL BID  Palliative care consulted for assistance with GOC conversations and evaluation for hospice  R-M.Tani Parra M.D.  Cardiology Attending  During non face-to-face time, I reviewed relevant portions of the patient’s medical record; including vitals, labs, medications, cardiac studies, remaining additional imaging and consultant recommendations. During face-to-face time, I took a relevant history and examined the patient. I also explained to the patient their diagnoses, and specific cardiopulmonary management plan, which required a high level of medical decision making.  I answered all questions related to the patient's medical conditions. I spent 55 minutes on total encounter; more than 50% of the visit was spent counseling and/or coordinating care by the attending physician, with plan of care attempted to discussed with the patient who was non participatory in interview, palliative care team, and cardiac care team.
89F with Dementia, Chronic Afib on Eliquis BIBEMS to [facility] ED 5/18/24 for increased lethargy, decreased PO intake and vomiting. Vitals notable for hypertensive emergency and EKG c/f STEMI.  STEMI code called in [facility] ED, patient chest pain free, troponin  .  IC team evaluated and deemed patient not to be candidate for cath given poor functional status, dementia and asymptomatic status. Patient admitted for medical management. Afib noted on monitor, initiated on A/C. ALMA on labs, very poor po intake, requires assisted feeds as patient will not take po without prompting  TTE LVEF 51% with RWMA c/w ischemic heart disease  CT brain negative for bleed  Plan  Administer 1L NS in 250cc aliquots  Eliquis 2.5 BID for afib cva risk reduction  CAD: ASA 81 monotherapy, Atorva 80 qHS, Lopressor 12.5 BID  HTN: Amlodipine 10 daily  Meds to be crushed and given with applesauce  DNR/DNI code status confirmed, molst updated  Palliative care consulted for assistance with GOC conversations and evaluation for hospice  --> patient does not definitively meet hospice criteria given preserved EF, hemodynamic stability, and lack of symptoms. son unable to manage patient safely at home (additional barrier to home hospice), desire [facility] ORIN as a bridge to LTC   SW/CM assisting with ORIN placement, will need auth  JYOTI.Tani Parra M.D.  Cardiology Attending  During non face-to-face time, I reviewed relevant portions of the patient’s medical record; including vitals, labs, medications, cardiac studies, remaining additional imaging and consultant recommendations. During face-to-face time, I took a relevant history and examined the patient. I also explained to the patient their diagnoses, and specific cardiopulmonary management plan, which required a high level of medical decision making.  I answered all questions related to the patient's medical conditions. I spent 55 minutes on total encounter; more than 50% of the visit was spent counseling and/or coordinating care by the attending physician, with plan of care attempted to discussed with the patient who was minimally participatory, cardiac care team.

## 2024-05-21 NOTE — PROGRESS NOTE ADULT - PROBLEM SELECTOR PLAN 1
Labs remarkable for Trop 665->725-->529, CK 1950, CKMB 24.5   - EKG ISHMAEL V3-V5, patient chest pain free, HDS, on room air  - TTE (5/20/24): LVEF 51%, RWMA, mild TR  - Continue Toprol 25 QD, Aspirin 80 QHS, ASA 81 daily    #ALMA  Cr 1.75, no hx of CKD, poor PO intake  - S/p IV NS 250cc x 4 hr

## 2024-05-21 NOTE — DISCHARGE NOTE PROVIDER - DETAILS OF MALNUTRITION DIAGNOSIS/DIAGNOSES
This patient has been assessed with a concern for Malnutrition and was treated during this hospitalization for the following Nutrition diagnosis/diagnoses:     -  05/20/2024: Moderate protein-calorie malnutrition

## 2024-05-21 NOTE — PROGRESS NOTE ADULT - PROBLEM SELECTOR PLAN 5
.  Complex medical decision making / symptom management in the setting of advanced illness.    Goals are established; Symptoms are managed. Palliative Care will SIGN OFF. Emotional support provided, questions answered.  Active Psychosocial Referrals:  [x]Social Work/Case management [x]PT/OT []Chaplaincy []Hospice  []Patient/Family Support []Holistic RN []Massage Therapy [x]Music Therapy []Ethics  Coping: [] well [x] with difficulty [] poor coping [] unable to assess  Support system: [] strong [x] adequate [] inadequate    For new or uncontrolled symptoms, please call Palliative Care at [phone]). The service is available 24/7 (including nights & weekends) to provide symptom management recommendations over the phone as appropriate

## 2024-05-22 VITALS
OXYGEN SATURATION: 99 % | RESPIRATION RATE: 18 BRPM | HEART RATE: 76 BPM | DIASTOLIC BLOOD PRESSURE: 89 MMHG | TEMPERATURE: 98 F | SYSTOLIC BLOOD PRESSURE: 167 MMHG

## 2024-05-22 LAB
ALBUMIN SERPL ELPH-MCNC: 4.6 G/DL — SIGNIFICANT CHANGE UP (ref 3.3–5)
ALP SERPL-CCNC: 69 U/L — SIGNIFICANT CHANGE UP (ref 40–120)
ALT FLD-CCNC: 30 U/L — SIGNIFICANT CHANGE UP (ref 10–45)
ANION GAP SERPL CALC-SCNC: 17 MMOL/L — SIGNIFICANT CHANGE UP (ref 5–17)
AST SERPL-CCNC: 37 U/L — SIGNIFICANT CHANGE UP (ref 10–40)
BILIRUB SERPL-MCNC: 1 MG/DL — SIGNIFICANT CHANGE UP (ref 0.2–1.2)
BUN SERPL-MCNC: 35 MG/DL — HIGH (ref 7–23)
CALCIUM SERPL-MCNC: 10.3 MG/DL — SIGNIFICANT CHANGE UP (ref 8.4–10.5)
CHLORIDE SERPL-SCNC: 104 MMOL/L — SIGNIFICANT CHANGE UP (ref 96–108)
CO2 SERPL-SCNC: 23 MMOL/L — SIGNIFICANT CHANGE UP (ref 22–31)
CREAT SERPL-MCNC: 1.29 MG/DL — SIGNIFICANT CHANGE UP (ref 0.5–1.3)
EGFR: 38 ML/MIN/1.73M2 — LOW
GLUCOSE SERPL-MCNC: 118 MG/DL — HIGH (ref 70–99)
HCT VFR BLD CALC: 42.4 % — SIGNIFICANT CHANGE UP (ref 34.5–45)
HGB BLD-MCNC: 13.7 G/DL — SIGNIFICANT CHANGE UP (ref 11.5–15.5)
MAGNESIUM SERPL-MCNC: 2.1 MG/DL — SIGNIFICANT CHANGE UP (ref 1.6–2.6)
MCHC RBC-ENTMCNC: 30.4 PG — SIGNIFICANT CHANGE UP (ref 27–34)
MCHC RBC-ENTMCNC: 32.3 GM/DL — SIGNIFICANT CHANGE UP (ref 32–36)
MCV RBC AUTO: 94 FL — SIGNIFICANT CHANGE UP (ref 80–100)
NRBC # BLD: 0 /100 WBCS — SIGNIFICANT CHANGE UP (ref 0–0)
PLATELET # BLD AUTO: 153 K/UL — SIGNIFICANT CHANGE UP (ref 150–400)
POTASSIUM SERPL-MCNC: 4.3 MMOL/L — SIGNIFICANT CHANGE UP (ref 3.5–5.3)
POTASSIUM SERPL-SCNC: 4.3 MMOL/L — SIGNIFICANT CHANGE UP (ref 3.5–5.3)
PROT SERPL-MCNC: 9.1 G/DL — HIGH (ref 6–8.3)
RBC # BLD: 4.51 M/UL — SIGNIFICANT CHANGE UP (ref 3.8–5.2)
RBC # FLD: 13.9 % — SIGNIFICANT CHANGE UP (ref 10.3–14.5)
SODIUM SERPL-SCNC: 144 MMOL/L — SIGNIFICANT CHANGE UP (ref 135–145)
WBC # BLD: 8.84 K/UL — SIGNIFICANT CHANGE UP (ref 3.8–10.5)
WBC # FLD AUTO: 8.84 K/UL — SIGNIFICANT CHANGE UP (ref 3.8–10.5)

## 2024-05-22 PROCEDURE — 86900 BLOOD TYPING SEROLOGIC ABO: CPT

## 2024-05-22 PROCEDURE — 99291 CRITICAL CARE FIRST HOUR: CPT | Mod: 25

## 2024-05-22 PROCEDURE — 85610 PROTHROMBIN TIME: CPT

## 2024-05-22 PROCEDURE — 84100 ASSAY OF PHOSPHORUS: CPT

## 2024-05-22 PROCEDURE — 84484 ASSAY OF TROPONIN QUANT: CPT

## 2024-05-22 PROCEDURE — 0225U NFCT DS DNA&RNA 21 SARSCOV2: CPT

## 2024-05-22 PROCEDURE — 82553 CREATINE MB FRACTION: CPT

## 2024-05-22 PROCEDURE — 99239 HOSP IP/OBS DSCHRG MGMT >30: CPT

## 2024-05-22 PROCEDURE — 85730 THROMBOPLASTIN TIME PARTIAL: CPT

## 2024-05-22 PROCEDURE — 99285 EMERGENCY DEPT VISIT HI MDM: CPT

## 2024-05-22 PROCEDURE — 85027 COMPLETE CBC AUTOMATED: CPT

## 2024-05-22 PROCEDURE — 82550 ASSAY OF CK (CPK): CPT

## 2024-05-22 PROCEDURE — 83036 HEMOGLOBIN GLYCOSYLATED A1C: CPT

## 2024-05-22 PROCEDURE — 70450 CT HEAD/BRAIN W/O DYE: CPT | Mod: MC

## 2024-05-22 PROCEDURE — 96375 TX/PRO/DX INJ NEW DRUG ADDON: CPT

## 2024-05-22 PROCEDURE — 83690 ASSAY OF LIPASE: CPT

## 2024-05-22 PROCEDURE — 87086 URINE CULTURE/COLONY COUNT: CPT

## 2024-05-22 PROCEDURE — 87040 BLOOD CULTURE FOR BACTERIA: CPT

## 2024-05-22 PROCEDURE — 80053 COMPREHEN METABOLIC PANEL: CPT

## 2024-05-22 PROCEDURE — 97116 GAIT TRAINING THERAPY: CPT

## 2024-05-22 PROCEDURE — 86901 BLOOD TYPING SEROLOGIC RH(D): CPT

## 2024-05-22 PROCEDURE — 80061 LIPID PANEL: CPT

## 2024-05-22 PROCEDURE — 85025 COMPLETE CBC W/AUTO DIFF WBC: CPT

## 2024-05-22 PROCEDURE — 84132 ASSAY OF SERUM POTASSIUM: CPT

## 2024-05-22 PROCEDURE — 82803 BLOOD GASES ANY COMBINATION: CPT

## 2024-05-22 PROCEDURE — 96372 THER/PROPH/DIAG INJ SC/IM: CPT | Mod: XU

## 2024-05-22 PROCEDURE — 93005 ELECTROCARDIOGRAM TRACING: CPT

## 2024-05-22 PROCEDURE — 93306 TTE W/DOPPLER COMPLETE: CPT

## 2024-05-22 PROCEDURE — 96374 THER/PROPH/DIAG INJ IV PUSH: CPT

## 2024-05-22 PROCEDURE — 83880 ASSAY OF NATRIURETIC PEPTIDE: CPT

## 2024-05-22 PROCEDURE — 36415 COLL VENOUS BLD VENIPUNCTURE: CPT

## 2024-05-22 PROCEDURE — 82330 ASSAY OF CALCIUM: CPT

## 2024-05-22 PROCEDURE — 71045 X-RAY EXAM CHEST 1 VIEW: CPT

## 2024-05-22 PROCEDURE — 84443 ASSAY THYROID STIM HORMONE: CPT

## 2024-05-22 PROCEDURE — 84295 ASSAY OF SERUM SODIUM: CPT

## 2024-05-22 PROCEDURE — 83605 ASSAY OF LACTIC ACID: CPT

## 2024-05-22 PROCEDURE — 83735 ASSAY OF MAGNESIUM: CPT

## 2024-05-22 PROCEDURE — 84439 ASSAY OF FREE THYROXINE: CPT

## 2024-05-22 PROCEDURE — 86850 RBC ANTIBODY SCREEN: CPT

## 2024-05-22 PROCEDURE — 97161 PT EVAL LOW COMPLEX 20 MIN: CPT

## 2024-05-22 PROCEDURE — 81001 URINALYSIS AUTO W/SCOPE: CPT

## 2024-05-22 RX ORDER — METOPROLOL TARTRATE 50 MG
0 TABLET ORAL
Refills: 0 | DISCHARGE

## 2024-05-22 RX ORDER — AMLODIPINE BESYLATE 2.5 MG/1
1 TABLET ORAL
Qty: 0 | Refills: 0 | DISCHARGE
Start: 2024-05-22

## 2024-05-22 RX ORDER — ATORVASTATIN CALCIUM 80 MG/1
1 TABLET, FILM COATED ORAL
Qty: 0 | Refills: 0 | DISCHARGE
Start: 2024-05-22

## 2024-05-22 RX ORDER — METOPROLOL TARTRATE 50 MG
0.5 TABLET ORAL
Qty: 0 | Refills: 0 | DISCHARGE

## 2024-05-22 RX ORDER — APIXABAN 2.5 MG/1
1 TABLET, FILM COATED ORAL
Refills: 0 | DISCHARGE

## 2024-05-22 RX ORDER — ASPIRIN/CALCIUM CARB/MAGNESIUM 324 MG
1 TABLET ORAL
Qty: 0 | Refills: 0 | DISCHARGE
Start: 2024-05-22

## 2024-05-22 RX ORDER — QUETIAPINE FUMARATE 200 MG/1
12.5 TABLET, FILM COATED ORAL ONCE
Refills: 0 | Status: DISCONTINUED | OUTPATIENT
Start: 2024-05-22 | End: 2024-05-22

## 2024-05-22 RX ORDER — LABETALOL HCL 100 MG
10 TABLET ORAL ONCE
Refills: 0 | Status: COMPLETED | OUTPATIENT
Start: 2024-05-22 | End: 2024-05-22

## 2024-05-22 RX ORDER — APIXABAN 2.5 MG/1
1 TABLET, FILM COATED ORAL
Qty: 0 | Refills: 0 | DISCHARGE
Start: 2024-05-22

## 2024-05-22 RX ADMIN — Medication 81 MILLIGRAM(S): at 11:19

## 2024-05-22 RX ADMIN — Medication 10 MILLIGRAM(S): at 06:25

## 2024-05-22 RX ADMIN — OLANZAPINE 2.5 MILLIGRAM(S): 15 TABLET, FILM COATED ORAL at 00:42

## 2024-05-22 RX ADMIN — Medication 12.5 MILLIGRAM(S): at 11:19

## 2024-05-22 RX ADMIN — AMLODIPINE BESYLATE 10 MILLIGRAM(S): 2.5 TABLET ORAL at 11:19

## 2024-05-22 RX ADMIN — APIXABAN 2.5 MILLIGRAM(S): 2.5 TABLET, FILM COATED ORAL at 11:19

## 2024-05-22 NOTE — PROVIDER CONTACT NOTE (OTHER) - ACTION/TREATMENT ORDERED:
10mg IV labetalol given.
10mg IV labetalol ordered
Not able to reach clinical impact team. Continued monitoring. Attempt IV insertion.
B/l wrist restraints placed at 05:30.

## 2024-05-22 NOTE — PROVIDER CONTACT NOTE (OTHER) - ASSESSMENT
Pt getting more restless and agitated.
Pt not allowing for full assessment
Pt not allowing for full assessment. LUE swollen/red/ecchymotic.

## 2024-05-22 NOTE — DISCHARGE NOTE NURSING/CASE MANAGEMENT/SOCIAL WORK - NSDCPEFALRISK_GEN_ALL_CORE
For information on Fall & Injury Prevention, visit: [email or web address] OR  [email or web address] OR  https://www.cdc.gov/steadi/patient.html

## 2024-05-22 NOTE — DISCHARGE NOTE NURSING/CASE MANAGEMENT/SOCIAL WORK - PATIENT PORTAL LINK FT
You can access the FollowMyHealth Patient Portal offered by [facility] by registering at the following website: [email or web address]. By joining [employer]’s FollowMyHealth portal, you will also be able to view your health information using other applications (apps) compatible with our system.

## 2024-05-22 NOTE — PROVIDER CONTACT NOTE (OTHER) - SITUATION
Pt /102 
Pt trying to get OOB constantly. Not able to redirect. Pulled off all monitors and clothing. Will not allow any intervention from nursing staff.
LUE IV site swollen, no longer flushing, not able to give IV labetalol
Pt /121 . No complaints of headache.

## 2024-05-22 NOTE — PROVIDER CONTACT NOTE (OTHER) - BACKGROUND
Pt A&Ox1 to self, increasingly agitated, bedrest, on frequent rounding d/t attempts to get OOB
Pt A&Ox1 to self. Agitated overnight. B/l wrist restraints placed. New IV placed.
Pt A&Ox1 to self, HTN, agitated, trying to get OOB, on frequent rounding protocol
Pt A&Ox1 to self. HTN. No IV access. Increasingly agitated overnight. Given IM zyprexa x2 overnight w/no effect.

## 2024-05-22 NOTE — CHART NOTE - NSCHARTNOTEFT_GEN_A_CORE
Patient continues to remain agitated despite redirection, supervision, and Zyprexa 2.5 mg IM x 2. Patient currently without IV access and refuses to swallow pills. Patient with underlying dementia now with superimposed hospital delirium. Wrists restraints ordered to be re-evaluated for removal once patient is no longer agitated and interfering with care. Continue to monoitor

## 2024-05-23 DIAGNOSIS — F03.90 UNSPECIFIED DEMENTIA WITHOUT BEHAVIORAL DISTURBANCE: ICD-10-CM

## 2024-05-23 DIAGNOSIS — I21.3 ST ELEVATION (STEMI) MYOCARDIAL INFARCTION OF UNSPECIFIED SITE: ICD-10-CM

## 2024-05-23 DIAGNOSIS — Z71.89 OTHER SPECIFIED COUNSELING: ICD-10-CM

## 2024-05-23 DIAGNOSIS — I16.1 HYPERTENSIVE EMERGENCY: ICD-10-CM

## 2024-05-23 DIAGNOSIS — R53.81 OTHER MALAISE: ICD-10-CM

## 2024-05-23 DIAGNOSIS — Z51.5 ENCOUNTER FOR PALLIATIVE CARE: ICD-10-CM

## 2024-05-23 LAB
CULTURE RESULTS: SIGNIFICANT CHANGE UP
CULTURE RESULTS: SIGNIFICANT CHANGE UP
SPECIMEN SOURCE: SIGNIFICANT CHANGE UP
SPECIMEN SOURCE: SIGNIFICANT CHANGE UP

## 2024-05-27 DIAGNOSIS — E86.0 DEHYDRATION: ICD-10-CM

## 2024-05-27 DIAGNOSIS — I25.10 ATHEROSCLEROTIC HEART DISEASE OF NATIVE CORONARY ARTERY WITHOUT ANGINA PECTORIS: ICD-10-CM

## 2024-05-27 DIAGNOSIS — R64 CACHEXIA: ICD-10-CM

## 2024-05-27 DIAGNOSIS — F05 DELIRIUM DUE TO KNOWN PHYSIOLOGICAL CONDITION: ICD-10-CM

## 2024-05-27 DIAGNOSIS — Z66 DO NOT RESUSCITATE: ICD-10-CM

## 2024-05-27 DIAGNOSIS — Z79.01 LONG TERM (CURRENT) USE OF ANTICOAGULANTS: ICD-10-CM

## 2024-05-27 DIAGNOSIS — I21.3 ST ELEVATION (STEMI) MYOCARDIAL INFARCTION OF UNSPECIFIED SITE: ICD-10-CM

## 2024-05-27 DIAGNOSIS — I48.20 CHRONIC ATRIAL FIBRILLATION, UNSPECIFIED: ICD-10-CM

## 2024-05-27 DIAGNOSIS — E44.0 MODERATE PROTEIN-CALORIE MALNUTRITION: ICD-10-CM

## 2024-05-27 DIAGNOSIS — I16.1 HYPERTENSIVE EMERGENCY: ICD-10-CM

## 2024-05-27 DIAGNOSIS — F03.C11 UNSPECIFIED DEMENTIA, SEVERE, WITH AGITATION: ICD-10-CM

## 2024-05-27 DIAGNOSIS — Z53.9 PROCEDURE AND TREATMENT NOT CARRIED OUT, UNSPECIFIED REASON: ICD-10-CM

## 2024-05-27 DIAGNOSIS — R53.81 OTHER MALAISE: ICD-10-CM

## 2024-05-27 DIAGNOSIS — I36.1 NONRHEUMATIC TRICUSPID (VALVE) INSUFFICIENCY: ICD-10-CM

## 2025-04-28 NOTE — ED PROVIDER NOTE - NS ED MD DISPO SPECIAL CONSIDERATION1
Pre visit planning completed.    Procedure details:    Patient scheduled for Upper endoscopy (EGD) on 05/14/2025.     Arrival time: 0730. Procedure time 0830    Facility location: [facility]; [address] [place] [address], [address] Floor, [place] [address]. Check in location: Main entrance at registration desk.  *Disclaimer: Drivers are to check in with patient and stay on campus during procedure.     Sedation type: Conscious sedation     Pre op exam needed? No.    Indication for procedure:   R10.13 (ICD-10-CM) - Epigastric pain     Chart review:     Electronic implanted devices? No    Recent diagnosis of diverticulitis within the last 6 weeks? No    Medication review:    Diabetic? No    Anticoagulants? No    Weight loss medication/injectable? Yes. Tirzepatide-Weight Management (Zepbound). Weekly dosing of medication.  HOLD 7 days before procedure.  Follow up with managing provider.     Other medication HOLDING recommendations:  N/A    Prep for procedure:     Bowel prep recommendation: N/A  Due to:  EGD    Procedure information and instructions sent via [employer]       Kimberly Manuel RN  Endoscopy Procedure Pre Assessment   [phone] option 3      None